# Patient Record
Sex: FEMALE | Race: WHITE | NOT HISPANIC OR LATINO | Employment: PART TIME | URBAN - METROPOLITAN AREA
[De-identification: names, ages, dates, MRNs, and addresses within clinical notes are randomized per-mention and may not be internally consistent; named-entity substitution may affect disease eponyms.]

---

## 2018-06-14 ENCOUNTER — APPOINTMENT (OUTPATIENT)
Dept: GENERAL RADIOLOGY | Facility: HOSPITAL | Age: 78
End: 2018-06-14

## 2018-06-14 ENCOUNTER — HOSPITAL ENCOUNTER (EMERGENCY)
Facility: HOSPITAL | Age: 78
Discharge: HOME | End: 2018-06-14
Attending: EMERGENCY MEDICINE | Admitting: NURSE PRACTITIONER

## 2018-06-14 VITALS
BODY MASS INDEX: 29.41 KG/M2 | HEART RATE: 70 BPM | WEIGHT: 166 LBS | OXYGEN SATURATION: 95 % | DIASTOLIC BLOOD PRESSURE: 68 MMHG | HEIGHT: 63 IN | RESPIRATION RATE: 16 BRPM | SYSTOLIC BLOOD PRESSURE: 154 MMHG | TEMPERATURE: 98.7 F

## 2018-06-14 DIAGNOSIS — I10 ESSENTIAL HYPERTENSION: Primary | ICD-10-CM

## 2018-06-14 LAB
ALBUMIN SERPL-MCNC: 4.74 G/DL (ref 3.2–4.8)
ALBUMIN/GLOB SERPL: 1.5 G/DL (ref 1.5–2.5)
ALP SERPL-CCNC: 80 U/L (ref 25–100)
ALT SERPL W P-5'-P-CCNC: 19 U/L (ref 7–40)
ANION GAP SERPL CALCULATED.3IONS-SCNC: 9 MMOL/L (ref 3–11)
AST SERPL-CCNC: 30 U/L (ref 0–33)
BASOPHILS # BLD AUTO: 0.02 10*3/MM3 (ref 0–0.2)
BASOPHILS NFR BLD AUTO: 0.3 % (ref 0–1)
BILIRUB SERPL-MCNC: 0.7 MG/DL (ref 0.3–1.2)
BNP SERPL-MCNC: 33 PG/ML (ref 0–100)
BUN BLD-MCNC: 11 MG/DL (ref 9–23)
BUN/CREAT SERPL: 15.1 (ref 7–25)
CALCIUM SPEC-SCNC: 9.8 MG/DL (ref 8.7–10.4)
CHLORIDE SERPL-SCNC: 107 MMOL/L (ref 99–109)
CO2 SERPL-SCNC: 26 MMOL/L (ref 20–31)
CREAT BLD-MCNC: 0.73 MG/DL (ref 0.6–1.3)
DEPRECATED RDW RBC AUTO: 45.2 FL (ref 37–54)
EOSINOPHIL # BLD AUTO: 0.19 10*3/MM3 (ref 0–0.3)
EOSINOPHIL NFR BLD AUTO: 3 % (ref 0–3)
ERYTHROCYTE [DISTWIDTH] IN BLOOD BY AUTOMATED COUNT: 13.2 % (ref 11.3–14.5)
GFR SERPL CREATININE-BSD FRML MDRD: 78 ML/MIN/1.73
GLOBULIN UR ELPH-MCNC: 3.1 GM/DL
GLUCOSE BLD-MCNC: 100 MG/DL (ref 70–100)
HCT VFR BLD AUTO: 44.2 % (ref 34.5–44)
HGB BLD-MCNC: 14.3 G/DL (ref 11.5–15.5)
IMM GRANULOCYTES # BLD: 0.01 10*3/MM3 (ref 0–0.03)
IMM GRANULOCYTES NFR BLD: 0.2 % (ref 0–0.6)
LYMPHOCYTES # BLD AUTO: 2.05 10*3/MM3 (ref 0.6–4.8)
LYMPHOCYTES NFR BLD AUTO: 32.1 % (ref 24–44)
MCH RBC QN AUTO: 30 PG (ref 27–31)
MCHC RBC AUTO-ENTMCNC: 32.4 G/DL (ref 32–36)
MCV RBC AUTO: 92.9 FL (ref 80–99)
MONOCYTES # BLD AUTO: 0.62 10*3/MM3 (ref 0–1)
MONOCYTES NFR BLD AUTO: 9.7 % (ref 0–12)
NEUTROPHILS # BLD AUTO: 3.5 10*3/MM3 (ref 1.5–8.3)
NEUTROPHILS NFR BLD AUTO: 54.7 % (ref 41–71)
PLATELET # BLD AUTO: 257 10*3/MM3 (ref 150–450)
PMV BLD AUTO: 10.3 FL (ref 6–12)
POTASSIUM BLD-SCNC: 4.7 MMOL/L (ref 3.5–5.5)
PROT SERPL-MCNC: 7.8 G/DL (ref 5.7–8.2)
RBC # BLD AUTO: 4.76 10*6/MM3 (ref 3.89–5.14)
SODIUM BLD-SCNC: 142 MMOL/L (ref 132–146)
TROPONIN I SERPL-MCNC: 0 NG/ML (ref 0–0.07)
WBC NRBC COR # BLD: 6.39 10*3/MM3 (ref 3.5–10.8)

## 2018-06-14 PROCEDURE — 85025 COMPLETE CBC W/AUTO DIFF WBC: CPT | Performed by: NURSE PRACTITIONER

## 2018-06-14 PROCEDURE — 83880 ASSAY OF NATRIURETIC PEPTIDE: CPT | Performed by: NURSE PRACTITIONER

## 2018-06-14 PROCEDURE — 71045 X-RAY EXAM CHEST 1 VIEW: CPT

## 2018-06-14 PROCEDURE — 84484 ASSAY OF TROPONIN QUANT: CPT

## 2018-06-14 PROCEDURE — 99284 EMERGENCY DEPT VISIT MOD MDM: CPT

## 2018-06-14 PROCEDURE — 93005 ELECTROCARDIOGRAM TRACING: CPT | Performed by: NURSE PRACTITIONER

## 2018-06-14 PROCEDURE — 80053 COMPREHEN METABOLIC PANEL: CPT | Performed by: NURSE PRACTITIONER

## 2018-06-14 RX ORDER — AMLODIPINE BESYLATE 5 MG/1
5 TABLET ORAL DAILY
COMMUNITY
End: 2018-12-11

## 2018-06-14 RX ORDER — PRAVASTATIN SODIUM 40 MG
40 TABLET ORAL NIGHTLY
COMMUNITY

## 2018-06-14 RX ORDER — NAPROXEN 500 MG/1
400 TABLET ORAL 2 TIMES DAILY PRN
COMMUNITY
End: 2019-07-02

## 2018-06-14 RX ORDER — OMEPRAZOLE 20 MG/1
20 CAPSULE, DELAYED RELEASE ORAL DAILY PRN
COMMUNITY
End: 2021-01-05 | Stop reason: HOSPADM

## 2018-06-14 RX ORDER — LOSARTAN POTASSIUM 50 MG/1
50 TABLET ORAL DAILY
COMMUNITY
End: 2018-12-11

## 2018-06-14 RX ORDER — ASPIRIN 81 MG/1
81 TABLET, CHEWABLE ORAL DAILY
COMMUNITY

## 2018-06-14 RX ORDER — AMLODIPINE BESYLATE 10 MG/1
10 TABLET ORAL
Status: DISCONTINUED | OUTPATIENT
Start: 2018-06-14 | End: 2018-06-15 | Stop reason: HOSPADM

## 2018-06-14 RX ORDER — SODIUM CHLORIDE 0.9 % (FLUSH) 0.9 %
10 SYRINGE (ML) INJECTION AS NEEDED
Status: DISCONTINUED | OUTPATIENT
Start: 2018-06-14 | End: 2018-06-14

## 2018-06-14 RX ADMIN — AMLODIPINE BESYLATE 10 MG: 10 TABLET ORAL at 20:39

## 2018-06-14 NOTE — ED PROVIDER NOTES
Subjective   Ms. Mine Curry is a 77 year old female who presents to the ED with c/o hypertension. She reports she had been experiencing neck pain which she read can be a side-effect of Losartan. She stopped taking Losartan (50 mg) 2 days ago and was checked at her PCP today and her blood pressure was high. She was given 2 doses of Losartan and started on Amlodipine 5 mg. Her blood pressure at home was 191 systolic, she took an Amlodipine at 1320 and her BP continued to rise which prompted her to present to the ED. She denies any symptoms. There are no other acute symptoms at this time.          History provided by:  Relative and patient  Hypertension   Severity:  Moderate  Onset quality:  Sudden  Duration:  2 days  Timing:  Constant  Progression:  Worsening  Chronicity:  Chronic  Time since last dose of antihypertensive:  1 hour  Notable PTA blood pressures:  191  Context: noncompliance (for 2 days)    Relieved by:  Nothing  Worsened by:  Nothing  Ineffective treatments:  Ca channel blockers  Associated symptoms: no shortness of breath        Review of Systems   Respiratory: Negative for shortness of breath.    Cardiovascular: Negative for leg swelling.   All other systems reviewed and are negative.      Past Medical History:   Diagnosis Date   • Hypertension    • Injury of back    • Spinal stenosis        No Known Allergies    Past Surgical History:   Procedure Laterality Date   • ANKLE SURGERY     • TUBAL ABDOMINAL LIGATION         History reviewed. No pertinent family history.    Social History     Social History   • Marital status:      Social History Main Topics   • Smoking status: Never Smoker   • Alcohol use Yes      Comment: DRINKS A BEER ONCE PER WEEK   • Drug use: No     Other Topics Concern   • Not on file         Objective   Physical Exam   Constitutional: She is oriented to person, place, and time. She appears well-developed and well-nourished. No distress.   HENT:   Head: Normocephalic and  atraumatic.   Nose: Nose normal.   Eyes: Conjunctivae are normal. No scleral icterus.   Neck: Normal range of motion. Neck supple.   Cardiovascular: Normal rate, regular rhythm and normal heart sounds.    Pulmonary/Chest: Effort normal and breath sounds normal. No respiratory distress.   Abdominal: Soft. Bowel sounds are normal. There is no tenderness.   Musculoskeletal: Normal range of motion. She exhibits no edema.   Neurological: She is alert and oriented to person, place, and time.   Skin: Skin is warm and dry.   Psychiatric: She has a normal mood and affect. Her behavior is normal.   Nursing note and vitals reviewed.      Procedures         ED Course  ED Course as of Jun 14 2212   Thu Jun 14, 2018 2205 Patient continues to assert that she is feeling well and continues to be asymptomatic.  She has had a total of four blood pressure meds today since morning.  She will resume her new regimen tomorrow with new BP med with keeping a written log of BP reading and confer with her PCP.  She understands parameters for concern that warrant return to the ER.   [ML]      ED Course User Index  [ML] RENNY Andrade       Recent Results (from the past 24 hour(s))   BNP    Collection Time: 06/14/18  7:42 PM   Result Value Ref Range    BNP 33.0 0.0 - 100.0 pg/mL   Comprehensive Metabolic Panel    Collection Time: 06/14/18  7:42 PM   Result Value Ref Range    Glucose 100 70 - 100 mg/dL    BUN 11 9 - 23 mg/dL    Creatinine 0.73 0.60 - 1.30 mg/dL    Sodium 142 132 - 146 mmol/L    Potassium 4.7 3.5 - 5.5 mmol/L    Chloride 107 99 - 109 mmol/L    CO2 26.0 20.0 - 31.0 mmol/L    Calcium 9.8 8.7 - 10.4 mg/dL    Total Protein 7.8 5.7 - 8.2 g/dL    Albumin 4.74 3.20 - 4.80 g/dL    ALT (SGPT) 19 7 - 40 U/L    AST (SGOT) 30 0 - 33 U/L    Alkaline Phosphatase 80 25 - 100 U/L    Total Bilirubin 0.7 0.3 - 1.2 mg/dL    eGFR Non African Amer 78 >60 mL/min/1.73    Globulin 3.1 gm/dL    A/G Ratio 1.5 1.5 - 2.5 g/dL    BUN/Creatinine  Ratio 15.1 7.0 - 25.0    Anion Gap 9.0 3.0 - 11.0 mmol/L   CBC Auto Differential    Collection Time: 06/14/18  7:42 PM   Result Value Ref Range    WBC 6.39 3.50 - 10.80 10*3/mm3    RBC 4.76 3.89 - 5.14 10*6/mm3    Hemoglobin 14.3 11.5 - 15.5 g/dL    Hematocrit 44.2 (H) 34.5 - 44.0 %    MCV 92.9 80.0 - 99.0 fL    MCH 30.0 27.0 - 31.0 pg    MCHC 32.4 32.0 - 36.0 g/dL    RDW 13.2 11.3 - 14.5 %    RDW-SD 45.2 37.0 - 54.0 fl    MPV 10.3 6.0 - 12.0 fL    Platelets 257 150 - 450 10*3/mm3    Neutrophil % 54.7 41.0 - 71.0 %    Lymphocyte % 32.1 24.0 - 44.0 %    Monocyte % 9.7 0.0 - 12.0 %    Eosinophil % 3.0 0.0 - 3.0 %    Basophil % 0.3 0.0 - 1.0 %    Immature Grans % 0.2 0.0 - 0.6 %    Neutrophils, Absolute 3.50 1.50 - 8.30 10*3/mm3    Lymphocytes, Absolute 2.05 0.60 - 4.80 10*3/mm3    Monocytes, Absolute 0.62 0.00 - 1.00 10*3/mm3    Eosinophils, Absolute 0.19 0.00 - 0.30 10*3/mm3    Basophils, Absolute 0.02 0.00 - 0.20 10*3/mm3    Immature Grans, Absolute 0.01 0.00 - 0.03 10*3/mm3   POC Troponin, Rapid    Collection Time: 06/14/18  7:46 PM   Result Value Ref Range    Troponin I 0.00 0.00 - 0.07 ng/mL     Note: In addition to lab results from this visit, the labs listed above may include labs taken at another facility or during a different encounter within the last 24 hours. Please correlate lab times with ED admission and discharge times for further clarification of the services performed during this visit.    XR Chest 1 View   Final Result      1. No acute findings.      2. Non-acute findings are described above.      THIS DOCUMENT HAS BEEN ELECTRONICALLY SIGNED BY NAM HODGES MD        Vitals:    06/14/18 2039 06/14/18 2109 06/14/18 2110 06/14/18 2115   BP: (!) 172/119 (!) 189/91 (!) 189/91 169/92   BP Location:   Left arm    Patient Position:   Sitting    Pulse: 71  72 69   Resp:   16    Temp:       TempSrc:       SpO2:  97% 94% 98%   Weight:       Height:         Medications   amLODIPine (NORVASC) tablet 10 mg (10 mg  Oral Given 6/14/18 2039)     ECG/EMG Results (last 24 hours)     Procedure Component Value Units Date/Time    ECG 12 Lead [[id number]] Collected:  06/14/18 1933     Updated:  06/14/18 1936                      MDM    Final diagnoses:   Essential hypertension       Documentation assistance provided by florina Carr.  Information recorded by the scribe was done at my direction and has been verified and validated by me.     Prateek Carr  06/14/18 2000       RENNY Andrade  06/14/18 9816

## 2018-06-15 NOTE — DISCHARGE INSTRUCTIONS
Resume your plan for taking amlodipine as directed by your primary care doctor in the morning.  Keep a written log of your blood pressure readings once a day.  Return to the emergency department as needed for worsening symptoms or concerns.  Thank you

## 2018-08-09 ENCOUNTER — HOSPITAL ENCOUNTER (EMERGENCY)
Facility: HOSPITAL | Age: 78
Discharge: HOME | End: 2018-08-10
Attending: EMERGENCY MEDICINE | Admitting: EMERGENCY MEDICINE

## 2018-08-09 ENCOUNTER — APPOINTMENT (OUTPATIENT)
Dept: GENERAL RADIOLOGY | Facility: HOSPITAL | Age: 78
End: 2018-08-09

## 2018-08-09 DIAGNOSIS — I10 UNCONTROLLED HYPERTENSION: ICD-10-CM

## 2018-08-09 DIAGNOSIS — R42 DIZZINESS: Primary | ICD-10-CM

## 2018-08-09 LAB
ALBUMIN SERPL-MCNC: 4.45 G/DL (ref 3.2–4.8)
ALBUMIN/GLOB SERPL: 1.6 G/DL (ref 1.5–2.5)
ALP SERPL-CCNC: 69 U/L (ref 25–100)
ALT SERPL W P-5'-P-CCNC: 19 U/L (ref 7–40)
ANION GAP SERPL CALCULATED.3IONS-SCNC: 17 MMOL/L (ref 3–11)
AST SERPL-CCNC: 23 U/L (ref 0–33)
BASOPHILS # BLD AUTO: 0.04 10*3/MM3 (ref 0–0.2)
BASOPHILS NFR BLD AUTO: 0.6 % (ref 0–1)
BILIRUB SERPL-MCNC: 0.4 MG/DL (ref 0.3–1.2)
BUN BLD-MCNC: 15 MG/DL (ref 9–23)
BUN/CREAT SERPL: 23.4 (ref 7–25)
CALCIUM SPEC-SCNC: 9.5 MG/DL (ref 8.7–10.4)
CHLORIDE SERPL-SCNC: 105 MMOL/L (ref 99–109)
CO2 SERPL-SCNC: 24 MMOL/L (ref 20–31)
CREAT BLD-MCNC: 0.64 MG/DL (ref 0.6–1.3)
DEPRECATED RDW RBC AUTO: 49.1 FL (ref 37–54)
EOSINOPHIL # BLD AUTO: 0.18 10*3/MM3 (ref 0–0.3)
EOSINOPHIL NFR BLD AUTO: 2.9 % (ref 0–3)
ERYTHROCYTE [DISTWIDTH] IN BLOOD BY AUTOMATED COUNT: 14.5 % (ref 11.3–14.5)
GFR SERPL CREATININE-BSD FRML MDRD: 90 ML/MIN/1.73
GLOBULIN UR ELPH-MCNC: 2.9 GM/DL
GLUCOSE BLD-MCNC: 108 MG/DL (ref 70–100)
HCT VFR BLD AUTO: 42.7 % (ref 34.5–44)
HGB BLD-MCNC: 14 G/DL (ref 11.5–15.5)
IMM GRANULOCYTES # BLD: 0.01 10*3/MM3 (ref 0–0.03)
IMM GRANULOCYTES NFR BLD: 0.2 % (ref 0–0.6)
LYMPHOCYTES # BLD AUTO: 2.09 10*3/MM3 (ref 0.6–4.8)
LYMPHOCYTES NFR BLD AUTO: 33.3 % (ref 24–44)
MCH RBC QN AUTO: 30.2 PG (ref 27–31)
MCHC RBC AUTO-ENTMCNC: 32.8 G/DL (ref 32–36)
MCV RBC AUTO: 92.2 FL (ref 80–99)
MONOCYTES # BLD AUTO: 0.6 10*3/MM3 (ref 0–1)
MONOCYTES NFR BLD AUTO: 9.6 % (ref 0–12)
NEUTROPHILS # BLD AUTO: 3.37 10*3/MM3 (ref 1.5–8.3)
NEUTROPHILS NFR BLD AUTO: 53.6 % (ref 41–71)
PLATELET # BLD AUTO: 258 10*3/MM3 (ref 150–450)
PMV BLD AUTO: 10.7 FL (ref 6–12)
POTASSIUM BLD-SCNC: 3.7 MMOL/L (ref 3.5–5.5)
PROT SERPL-MCNC: 7.3 G/DL (ref 5.7–8.2)
RBC # BLD AUTO: 4.63 10*6/MM3 (ref 3.89–5.14)
SODIUM BLD-SCNC: 146 MMOL/L (ref 132–146)
WBC NRBC COR # BLD: 6.28 10*3/MM3 (ref 3.5–10.8)

## 2018-08-09 PROCEDURE — 93005 ELECTROCARDIOGRAM TRACING: CPT | Performed by: NURSE PRACTITIONER

## 2018-08-09 PROCEDURE — 81001 URINALYSIS AUTO W/SCOPE: CPT | Performed by: NURSE PRACTITIONER

## 2018-08-09 PROCEDURE — 80053 COMPREHEN METABOLIC PANEL: CPT | Performed by: NURSE PRACTITIONER

## 2018-08-09 PROCEDURE — 85025 COMPLETE CBC W/AUTO DIFF WBC: CPT | Performed by: NURSE PRACTITIONER

## 2018-08-09 PROCEDURE — 71045 X-RAY EXAM CHEST 1 VIEW: CPT

## 2018-08-09 PROCEDURE — 84484 ASSAY OF TROPONIN QUANT: CPT

## 2018-08-09 PROCEDURE — 99283 EMERGENCY DEPT VISIT LOW MDM: CPT

## 2018-08-09 RX ORDER — SODIUM CHLORIDE 0.9 % (FLUSH) 0.9 %
10 SYRINGE (ML) INJECTION AS NEEDED
Status: DISCONTINUED | OUTPATIENT
Start: 2018-08-09 | End: 2018-08-10 | Stop reason: HOSPADM

## 2018-08-09 RX ORDER — NIFEDIPINE 90 MG/1
90 TABLET, EXTENDED RELEASE ORAL DAILY
COMMUNITY
End: 2019-07-02

## 2018-08-09 RX ORDER — HYDROCHLOROTHIAZIDE 25 MG/1
25 TABLET ORAL DAILY
COMMUNITY
End: 2018-12-11

## 2018-08-10 ENCOUNTER — APPOINTMENT (OUTPATIENT)
Dept: MRI IMAGING | Facility: HOSPITAL | Age: 78
End: 2018-08-10

## 2018-08-10 VITALS
SYSTOLIC BLOOD PRESSURE: 131 MMHG | DIASTOLIC BLOOD PRESSURE: 46 MMHG | HEIGHT: 63 IN | WEIGHT: 166 LBS | OXYGEN SATURATION: 96 % | BODY MASS INDEX: 29.41 KG/M2 | TEMPERATURE: 97.4 F | HEART RATE: 63 BPM | RESPIRATION RATE: 18 BRPM

## 2018-08-10 LAB
BACTERIA UR QL AUTO: NORMAL /HPF
BILIRUB UR QL STRIP: NEGATIVE
CLARITY UR: CLEAR
COLOR UR: YELLOW
GLUCOSE UR STRIP-MCNC: NEGATIVE MG/DL
HGB UR QL STRIP.AUTO: NEGATIVE
HYALINE CASTS UR QL AUTO: NORMAL /LPF
KETONES UR QL STRIP: NEGATIVE
LEUKOCYTE ESTERASE UR QL STRIP.AUTO: ABNORMAL
NITRITE UR QL STRIP: NEGATIVE
PH UR STRIP.AUTO: 6.5 [PH] (ref 5–8)
PROT UR QL STRIP: NEGATIVE
RBC # UR: NORMAL /HPF
REF LAB TEST METHOD: NORMAL
SP GR UR STRIP: 1.01 (ref 1–1.03)
SQUAMOUS #/AREA URNS HPF: NORMAL /HPF
TROPONIN I SERPL-MCNC: 0 NG/ML (ref 0–0.07)
UROBILINOGEN UR QL STRIP: ABNORMAL
WBC UR QL AUTO: NORMAL /HPF

## 2018-08-10 PROCEDURE — 70551 MRI BRAIN STEM W/O DYE: CPT

## 2018-08-10 RX ORDER — MECLIZINE HYDROCHLORIDE 25 MG/1
25 TABLET ORAL 3 TIMES DAILY PRN
Qty: 24 TABLET | Refills: 0 | Status: SHIPPED | OUTPATIENT
Start: 2018-08-10 | End: 2018-12-11

## 2018-08-10 RX ORDER — MECLIZINE HYDROCHLORIDE 25 MG/1
25 TABLET ORAL ONCE
Status: COMPLETED | OUTPATIENT
Start: 2018-08-10 | End: 2018-08-10

## 2018-08-10 RX ADMIN — MECLIZINE 25 MG: 25 TABLET ORAL at 01:42

## 2018-08-10 NOTE — ED PROVIDER NOTES
Subjective   Mine Curry is a 77 y.o. female with a hx of HTN who presents to the ED with c/o hypertension. The patient reports of an episode of dizziness last night when she got up from bed to go to the bathroom. Since this incident she notes that she would become dizzy whenever she stands up. She checked her blood pressure PTA and notes that it was 178 systolic which prompted her visit to the ED. The patient denies N/V, chest pain, SoA, generalized weakness, or any other acute complaints at this time.        History provided by:  Patient  Hypertension   Onset quality:  Sudden  Timing:  Constant  Progression:  Worsening  Chronicity:  Chronic  Notable PTA blood pressures:  178  Relieved by:  None tried  Worsened by:  Nothing  Ineffective treatments:  None tried  Associated symptoms: dizziness    Associated symptoms: no chest pain, no fever, no nausea, no shortness of breath, not vomiting and no weakness        Review of Systems   Constitutional: Negative for chills and fever.   Respiratory: Negative for shortness of breath.    Cardiovascular: Negative for chest pain.   Gastrointestinal: Negative for nausea and vomiting.   Neurological: Positive for dizziness. Negative for weakness. Tremors: generalized.   All other systems reviewed and are negative.      Past Medical History:   Diagnosis Date   • Hypertension    • Injury of back    • Spinal stenosis        No Known Allergies    Past Surgical History:   Procedure Laterality Date   • ANKLE SURGERY     • TUBAL ABDOMINAL LIGATION         History reviewed. No pertinent family history.    Social History     Social History   • Marital status:      Social History Main Topics   • Smoking status: Never Smoker   • Alcohol use Yes      Comment: DRINKS A BEER ONCE PER WEEK   • Drug use: No     Other Topics Concern   • Not on file         Objective   Physical Exam   Constitutional: She is oriented to person, place, and time. She appears well-developed and  well-nourished. No distress.   HENT:   Head: Normocephalic and atraumatic.   Nose: Nose normal.   Eyes: Conjunctivae are normal.   Neck: Normal range of motion. Neck supple.   Cardiovascular: Normal rate, regular rhythm, normal heart sounds and intact distal pulses.    No murmur heard.  Pulmonary/Chest: Effort normal and breath sounds normal. No respiratory distress.   Abdominal: Soft. Bowel sounds are normal. There is no tenderness.   Musculoskeletal: Normal range of motion. She exhibits no edema.   Neurological: She is alert and oriented to person, place, and time. No cranial nerve deficit. Coordination normal.   Skin: Skin is warm and dry.   Psychiatric: She has a normal mood and affect. Her behavior is normal.   Nursing note and vitals reviewed.      Procedures         ED Course  ED Course as of Aug 10 0448   Fri Aug 10, 2018   [id number]  patient is advised of results at this time.  Patient will be discharged home.  Patient tolerated ambulation trial without difficulty.  Patient agrees and verbalizes understanding.  [KG]      ED Course User Index  [KG] Rylee Amor, RENNY       Recent Results (from the past 24 hour(s))   Comprehensive Metabolic Panel    Collection Time: 08/09/18 11:09 PM   Result Value Ref Range    Glucose 108 (H) 70 - 100 mg/dL    BUN 15 9 - 23 mg/dL    Creatinine 0.64 0.60 - 1.30 mg/dL    Sodium 146 132 - 146 mmol/L    Potassium 3.7 3.5 - 5.5 mmol/L    Chloride 105 99 - 109 mmol/L    CO2 24.0 20.0 - 31.0 mmol/L    Calcium 9.5 8.7 - 10.4 mg/dL    Total Protein 7.3 5.7 - 8.2 g/dL    Albumin 4.45 3.20 - 4.80 g/dL    ALT (SGPT) 19 7 - 40 U/L    AST (SGOT) 23 0 - 33 U/L    Alkaline Phosphatase 69 25 - 100 U/L    Total Bilirubin 0.4 0.3 - 1.2 mg/dL    eGFR Non African Amer 90 >60 mL/min/1.73    Globulin 2.9 gm/dL    A/G Ratio 1.6 1.5 - 2.5 g/dL    BUN/Creatinine Ratio 23.4 7.0 - 25.0    Anion Gap 17.0 (H) 3.0 - 11.0 mmol/L   CBC Auto Differential    Collection Time: 08/09/18 11:09 PM   Result  Value Ref Range    WBC 6.28 3.50 - 10.80 10*3/mm3    RBC 4.63 3.89 - 5.14 10*6/mm3    Hemoglobin 14.0 11.5 - 15.5 g/dL    Hematocrit 42.7 34.5 - 44.0 %    MCV 92.2 80.0 - 99.0 fL    MCH 30.2 27.0 - 31.0 pg    MCHC 32.8 32.0 - 36.0 g/dL    RDW 14.5 11.3 - 14.5 %    RDW-SD 49.1 37.0 - 54.0 fl    MPV 10.7 6.0 - 12.0 fL    Platelets 258 150 - 450 10*3/mm3    Neutrophil % 53.6 41.0 - 71.0 %    Lymphocyte % 33.3 24.0 - 44.0 %    Monocyte % 9.6 0.0 - 12.0 %    Eosinophil % 2.9 0.0 - 3.0 %    Basophil % 0.6 0.0 - 1.0 %    Immature Grans % 0.2 0.0 - 0.6 %    Neutrophils, Absolute 3.37 1.50 - 8.30 10*3/mm3    Lymphocytes, Absolute 2.09 0.60 - 4.80 10*3/mm3    Monocytes, Absolute 0.60 0.00 - 1.00 10*3/mm3    Eosinophils, Absolute 0.18 0.00 - 0.30 10*3/mm3    Basophils, Absolute 0.04 0.00 - 0.20 10*3/mm3    Immature Grans, Absolute 0.01 0.00 - 0.03 10*3/mm3   POC Troponin, Rapid    Collection Time: 08/09/18 11:40 PM   Result Value Ref Range    Troponin I 0.00 0.00 - 0.07 ng/mL   Urinalysis With Microscopic If Indicated (No Culture) - Urine, Clean Catch    Collection Time: 08/09/18 11:45 PM   Result Value Ref Range    Color, UA Yellow Yellow, Straw    Appearance, UA Clear Clear    pH, UA 6.5 5.0 - 8.0    Specific Gravity, UA 1.007 1.001 - 1.030    Glucose, UA Negative Negative    Ketones, UA Negative Negative    Bilirubin, UA Negative Negative    Blood, UA Negative Negative    Protein, UA Negative Negative    Leuk Esterase, UA Small (1+) (A) Negative    Nitrite, UA Negative Negative    Urobilinogen, UA 0.2 E.U./dL 0.2 - 1.0 E.U./dL   Urinalysis, Microscopic Only - Urine, Clean Catch    Collection Time: 08/09/18 11:45 PM   Result Value Ref Range    RBC, UA 0-2 None Seen, 0-2 /HPF    WBC, UA 0-2 None Seen, 0-2 /HPF    Bacteria, UA None Seen None Seen, Trace /HPF    Squamous Epithelial Cells, UA 0-2 None Seen, 0-2 /HPF    Hyaline Casts, UA 0-6 0 - 6 /LPF    Methodology Automated Microscopy      Note: In addition to lab results from  "this visit, the labs listed above may include labs taken at another facility or during a different encounter within the last 24 hours. Please correlate lab times with ED admission and discharge times for further clarification of the services performed during this visit.    MRI Brain Without Contrast   Final Result     No acute infarct, acute intracranial hemorrhage, or mass effect.      THIS DOCUMENT HAS BEEN ELECTRONICALLY SIGNED BY MARIA C DORAN MD      XR Chest 1 View   Final Result     Clear lungs.      THIS DOCUMENT HAS BEEN ELECTRONICALLY SIGNED BY MARIA C DORAN MD        Vitals:    08/09/18 2029 08/09/18 2253 08/10/18 0142   BP: 174/83 143/75 131/46   Pulse: 75 63 63   Resp: 18  18   Temp: 97.4 °F (36.3 °C)     TempSrc: Oral     SpO2: 98% 97% 96%   Weight: 75.3 kg (166 lb)     Height: 160 cm (63\")       Medications   meclizine (ANTIVERT) tablet 25 mg (25 mg Oral Given 8/10/18 0142)     ECG/EMG Results (last 24 hours)     Procedure Component Value Units Date/Time    ECG 12 Lead [[id number]] Collected:  08/09/18 2315     Updated:  08/09/18 2316                      MDM    Final diagnoses:   Dizziness   Uncontrolled hypertension       Documentation assistance provided by florina Santos.  Information recorded by the scribe was done at my direction and has been verified and validated by me.     Mynor Santos  08/09/18 9059       Rylee Amor APRN  08/10/18 0362    "

## 2018-12-11 ENCOUNTER — HOSPITAL ENCOUNTER (EMERGENCY)
Facility: HOSPITAL | Age: 78
Discharge: HOME | End: 2018-12-11
Attending: EMERGENCY MEDICINE | Admitting: EMERGENCY MEDICINE

## 2018-12-11 ENCOUNTER — APPOINTMENT (OUTPATIENT)
Dept: GENERAL RADIOLOGY | Facility: HOSPITAL | Age: 78
End: 2018-12-11

## 2018-12-11 VITALS
WEIGHT: 170 LBS | BODY MASS INDEX: 30.12 KG/M2 | RESPIRATION RATE: 18 BRPM | HEIGHT: 63 IN | OXYGEN SATURATION: 99 % | TEMPERATURE: 97.6 F | DIASTOLIC BLOOD PRESSURE: 76 MMHG | HEART RATE: 87 BPM | SYSTOLIC BLOOD PRESSURE: 147 MMHG

## 2018-12-11 DIAGNOSIS — S80.01XA CONTUSION OF RIGHT KNEE, INITIAL ENCOUNTER: ICD-10-CM
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
## 2018-12-11 DIAGNOSIS — Z86.79 HISTORY OF HYPERTENSION: ICD-10-CM

## 2018-12-11 DIAGNOSIS — S70.01XA CONTUSION OF RIGHT HIP, INITIAL ENCOUNTER: ICD-10-CM

## 2018-12-11 DIAGNOSIS — S20.211A CONTUSION OF RIBS, RIGHT, INITIAL ENCOUNTER: ICD-10-CM
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
## 2018-12-11 PROCEDURE — 99283 EMERGENCY DEPT VISIT LOW MDM: CPT

## 2018-12-11 PROCEDURE — 71101 X-RAY EXAM UNILAT RIBS/CHEST: CPT

## 2018-12-11 PROCEDURE — 73502 X-RAY EXAM HIP UNI 2-3 VIEWS: CPT

## 2018-12-11 PROCEDURE — 73560 X-RAY EXAM OF KNEE 1 OR 2: CPT

## 2018-12-11 NOTE — ED PROVIDER NOTES
Subjective   Mine Curry is a 78 y.o.female with a history of hypertension and chronic back pain from spinal stenosis who presents to the emergency department with complaints of a fall. The patient states that she was at work this evening when she tripped over a box and fell on her right side into a counter. She was able to catch herself with her right arm before landing with her right knee on the floor. She's been ambulatory since the event but currently endorses pain over the right lateral knee, the right lateral chest/axilla and breast, the extensor surface of the right upper arm, the right hip, and the left side of the neck. She denies hitting her head or experiencing loss of consciousness. There are no other acute complaints at this time.        History provided by:  Patient  Fall   Mechanism of injury: fall    Injury location:  Torso, shoulder/arm and head/neck  Head/neck injury location:  L neck  Shoulder/arm injury location:  R upper arm  Torso injury location:  R breast and R chest  Incident location:  Work  Arrived directly from scene: yes    Fall:     Fall occurred:  Tripped and walking    Impact surface:  Furniture and hard floor (counter)    Point of impact:  Knees and outstretched arms    Entrapped after fall: no    Suspicion of alcohol use: no    Suspicion of drug use: no    Prior to arrival data:     Patient ambulatory at scene: yes      Loss of consciousness: no      Amnesic to event: no    Associated symptoms: neck pain    Associated symptoms: no abdominal pain, no back pain, no chest pain, no headaches, no loss of consciousness, no nausea and no vomiting    Associated symptoms comment:  Pain in right axilla      Review of Systems   Respiratory: Negative for shortness of breath.    Cardiovascular: Negative for chest pain.   Gastrointestinal: Negative for abdominal pain, nausea and vomiting.   Musculoskeletal: Positive for arthralgias (right knee) and neck pain. Negative for back pain and gait  problem.        Pain in right axilla, right lateral chest wall, right hip, and right knee   Skin: Negative.    Neurological: Negative for dizziness, loss of consciousness, syncope, light-headedness and headaches.   All other systems reviewed and are negative.      Past Medical History:   Diagnosis Date   • Hypertension    • Injury of back    • Spinal stenosis        No Known Allergies    Past Surgical History:   Procedure Laterality Date   • ANKLE SURGERY     • TUBAL ABDOMINAL LIGATION         History reviewed. No pertinent family history.    Social History     Socioeconomic History   • Marital status:      Spouse name: Not on file   • Number of children: Not on file   • Years of education: Not on file   • Highest education level: Not on file   Tobacco Use   • Smoking status: Never Smoker   Substance and Sexual Activity   • Alcohol use: Yes     Comment: DRINKS A BEER ONCE PER WEEK   • Drug use: No         Objective   Physical Exam   Constitutional: She is oriented to person, place, and time. She appears well-developed and well-nourished. No distress.   HENT:   Head: Normocephalic and atraumatic.   Eyes: Conjunctivae are normal. No scleral icterus.   Neck: Normal range of motion. Neck supple.   Spasms of the left cervical myofascia. Full range of motion of the neck.   Cardiovascular: Normal rate, regular rhythm and normal heart sounds.   No murmur heard.  Pulmonary/Chest: Effort normal and breath sounds normal. No respiratory distress. She exhibits tenderness.   Tenderness to palpation of the right lateral ribcage near the axilla. No bruising, deformity, or palpable crepitus. No right breast tenderness.   Abdominal: Soft. There is no tenderness.   Musculoskeletal: Normal range of motion. She exhibits tenderness. She exhibits no edema or deformity.        Right hip: She exhibits tenderness. She exhibits no deformity.        Right knee: She exhibits normal range of motion, no swelling and no effusion.  "Tenderness found.   No CTL spine tendernes. Tenderness to palpation of the right hip. No deformity or dislocation. Tenderness to palpation of the right lateral knee. No soft tissue swelling or effusion. Full range of motion.   Neurological: She is alert and oriented to person, place, and time.   Skin: Skin is warm and dry. No erythema.   Psychiatric: She has a normal mood and affect. Her behavior is normal.   Nursing note and vitals reviewed.      Procedures         ED Course  ED Course as of Dec 11 1849   Tue Dec 11, 2018   1845 All radiologic studies were read by the radiologist and were all within normal limits.  There was no acute right rib fracture and there was no evidence of pneumothorax.  X-rays of the right hip and pelvis showed no acute bony abnormality and x-rays of the right knee showed no acute bony abnormality.  I discussed all normal results with patient.  She verbalized understanding.  Recommended Tylenol/ibuprofen every 4-6 hours as needed for pain.  I will fill out Worker's Compensation paperwork.  Recommend close PCP follow-up for recheck.  [FC]      ED Course User Index  [FC] Lilian Hodge, OLGA     No results found for this or any previous visit (from the past 24 hour(s)).  Note: In addition to lab results from this visit, the labs listed above may include labs taken at another facility or during a different encounter within the last 24 hours. Please correlate lab times with ED admission and discharge times for further clarification of the services performed during this visit.    XR Ribs Right With PA Chest    (Results Pending)   XR Hip With or Without Pelvis 2 - 3 View Right    (Results Pending)   XR Knee 1 or 2 View Right    (Results Pending)     Vitals:    12/11/18 1627   BP: (!) 198/91   BP Location: Left arm   Patient Position: Sitting   Pulse: 89   Resp: 20   Temp: 97.6 °F (36.4 °C)   TempSrc: Oral   SpO2: 98%   Weight: 77.1 kg (170 lb)   Height: 160 cm (63\")     Medications - No data to " display  ECG/EMG Results (last 24 hours)     ** No results found for the last 24 hours. **                       MDM    Final diagnoses:   Fall, initial encounter   Work related injury   Contusion of ribs, right, initial encounter   Contusion of right hip, initial encounter   Contusion of right knee, initial encounter   History of hypertension       Documentation assistance provided by florina Cardenas.  Information recorded by the scribe was done at my direction and has been verified and validated by me.     Anju Cardenas  12/11/18 180       Lilian Hodge PA-C  12/11/18 2464

## 2018-12-11 NOTE — DISCHARGE INSTRUCTIONS
All radiologic studies during the ER evaluation showed no acute bony abnormality from fall at patient's place of work.  Recommend Tylenol/ibuprofen every 4-6 hours as needed for pain.  Encourage deep breathing even though pain to the right lateral chest wall so as to prevent pneumonia.  Follow up closely with primary care physician.  Return if any worsening symptoms.

## 2019-07-01 NOTE — PROGRESS NOTES
[facility] at [facility]  Consultation History and Physical  Mine Curry  1940    VISIT DATE:  07/02/19    PCP:   Ten Alicea, DO  [address] [place] [address]      CC:  PVCs    Problem List:  1.  HTN  2.  HLD  3.  GERD  4.  DJD  5.  PVCs      History of Present Illness:  Mine Curry  Is a 78 y.o. female with pertinent cardiac history detailed above.  Patient referred by Dr. Contreras for evaluation of PVCs.  She been complaining of palpitations.  EKG in primary care office showed ventricular bigeminy.  She wore a Holter monitor showing 11.8% PVCs.  She was advised to cut down on caffeine intake by her PCP and she states this has helped the frequency of the palpitations.  She denies any exercise intolerance, dyspnea, chest pain.  No syncope or presyncope.  Not currently on any specific therapies for PVCs.  Has not had a recent echocardiogram.  Blood pressures controlled in clinic today.  No other complaints      There are no active problems to display for this patient.      Allergies   Allergen Reactions   • Hydrocodone Itching   • Iodine Swelling       Social History     Socioeconomic History   • Marital status:      Spouse name: Not on file   • Number of children: Not on file   • Years of education: Not on file   • Highest education level: Not on file   Tobacco Use   • Smoking status: Never Smoker   • Smokeless tobacco: Never Used   Substance and Sexual Activity   • Alcohol use: Yes     Comment: DRINKS A BEER ONCE PER WEEK   • Drug use: No   • Sexual activity: Defer       Family History   Problem Relation Age of Onset   • Arthritis Mother    • Aneurysm Father    • Hypertension Father    • Stroke Father    • Arthritis Father        Current Medications:    Current Outpatient Medications:   •  amLODIPine (NORVASC) 10 MG tablet, Take 10 mg by mouth Daily., Disp: , Rfl:   •  aspirin 81 MG chewable tablet, Chew 81 mg Daily., Disp: , Rfl:   •   "hydrochlorothiazide (MICROZIDE) 12.5 MG capsule, Take 12.5 mg by mouth Daily., Disp: , Rfl:   •  IBUPROFEN PO, Take 400 mg by mouth Daily., Disp: , Rfl:   •  OMEPRAZOLE PO, Take 20 mg by mouth As Needed., Disp: , Rfl:   •  Pravastatin Sodium (PRAVACHOL PO), Take 40 mg by mouth., Disp: , Rfl:   •  VITAMIN E PO, Take 1 tablet by mouth Daily., Disp: , Rfl:   •  metoprolol succinate XL (TOPROL-XL) 25 MG 24 hr tablet, Take 1 tablet by mouth Daily., Disp: 30 tablet, Rfl: 11     Review of Systems   Constitution: Negative for weakness and malaise/fatigue.   Cardiovascular: Positive for irregular heartbeat and palpitations. Negative for chest pain, dyspnea on exertion, orthopnea and syncope.   Respiratory: Negative for cough and shortness of breath.    All other systems reviewed and are negative.      Vitals:    07/02/19 0849   BP: 128/78   BP Location: Right arm   Patient Position: Sitting   Pulse: 71   SpO2: 98%   Weight: 74.4 kg (164 lb)   Height: 160 cm (63\")       Physical Exam   Constitutional: She is oriented to person, place, and time. She appears well-developed and well-nourished.   HENT:   Head: Normocephalic and atraumatic.   Neck: Neck supple. No JVD present.   Cardiovascular: Normal rate, regular rhythm, normal heart sounds and intact distal pulses. Exam reveals no gallop and no friction rub.   No murmur heard.  Pulmonary/Chest: Effort normal and breath sounds normal.   Abdominal: Soft. Bowel sounds are normal.   Musculoskeletal: Normal range of motion. She exhibits no edema.   Neurological: She is alert and oriented to person, place, and time.   Skin: Skin is warm and dry.   Nursing note and vitals reviewed.      Diagnostic Data:    ECG 12 Lead  Date/Time: 7/2/2019 9:02 AM  Performed by: Bear Solorzano MD  Authorized by: Bear Solorzano MD   Comparison: compared with previous ECG from 8/9/2018  Similar to previous ECG  Rhythm: sinus rhythm  Rate: normal  BPM: 66  QRS axis: normal  Other " findings: low voltage    Clinical impression: non-specific ECG  Clinical impression comment: Borderline left atrial enlargement.  Low voltage limb leads            No results found for: CHLPL, TRIG, HDL, LDLDIRECT  Lab Results   Component Value Date    GLUCOSE 108 (H) 08/09/2018    BUN 15 08/09/2018    CREATININE 0.64 08/09/2018     08/09/2018    K 3.7 08/09/2018     08/09/2018    CO2 24.0 08/09/2018     No results found for: HGBA1C  Lab Results   Component Value Date    WBC 6.28 08/09/2018    HGB 14.0 08/09/2018    HCT 42.7 08/09/2018     08/09/2018       Assessment:   Diagnosis Plan   1. PVC's (premature ventricular contractions)  ECG 12 Lead    Adult Transthoracic Echo Complete W/ Cont if Necessary Per Protocol       Plan:    -Currently symptomatic.    -EKG in clinic today without any ectopy.  Recent Holter monitor showed 11.8% PVCs in 24-hour.  With occasional bigeminy  -Start Toprol-XL 25 mg daily for suppression.  Will titrate up as needed.  -Obtain an echocardiogram to evaluate for any underlying structural heart disease  -Can adjust other antihypertensives as needed to increase beta-blocker  -Continue pravastatin for hyperlipidemia we will request most recent lipid panel from PCPs office  -Follow-up 6 weeks          Bear Solorzano MD

## 2019-07-02 ENCOUNTER — CONSULT (OUTPATIENT)
Dept: CARDIOLOGY | Facility: CLINIC | Age: 79
End: 2019-07-02

## 2019-07-02 VITALS
HEIGHT: 63 IN | HEART RATE: 71 BPM | OXYGEN SATURATION: 98 % | BODY MASS INDEX: 29.06 KG/M2 | SYSTOLIC BLOOD PRESSURE: 128 MMHG | WEIGHT: 164 LBS | DIASTOLIC BLOOD PRESSURE: 78 MMHG

## 2019-07-02 DIAGNOSIS — I49.3 PVC'S (PREMATURE VENTRICULAR CONTRACTIONS): Primary | ICD-10-CM

## 2019-07-02 PROCEDURE — 99204 OFFICE O/P NEW MOD 45 MIN: CPT | Performed by: INTERNAL MEDICINE

## 2019-07-02 PROCEDURE — 93000 ELECTROCARDIOGRAM COMPLETE: CPT | Performed by: INTERNAL MEDICINE

## 2019-07-02 RX ORDER — HYDROCHLOROTHIAZIDE 12.5 MG/1
12.5 CAPSULE, GELATIN COATED ORAL AS NEEDED
COMMUNITY
End: 2020-12-28

## 2019-07-02 RX ORDER — AMLODIPINE BESYLATE 10 MG/1
10 TABLET ORAL DAILY
COMMUNITY

## 2019-07-02 RX ORDER — IBUPROFEN 400 MG/1
400 TABLET ORAL AS NEEDED
COMMUNITY
End: 2021-01-05 | Stop reason: HOSPADM

## 2019-07-02 RX ORDER — CYANOCOBALAMIN (VITAMIN B-12) 500 MCG
1 LOZENGE ORAL DAILY
COMMUNITY

## 2019-07-02 RX ORDER — METOPROLOL SUCCINATE 25 MG/1
25 TABLET, EXTENDED RELEASE ORAL DAILY
Qty: 30 TABLET | Refills: 11 | Status: SHIPPED | OUTPATIENT
Start: 2019-07-02 | End: 2020-12-28

## 2019-08-19 NOTE — PROGRESS NOTES
[facility]  Follow Up Visit  Mine Curry  1940    VISIT DATE:  08/20/19    PCP:   Ten Alicea, DO  [address] [place] [address]      CC:  PVC's      Problem List:  1.  HTN  2.  HLD  3.  GERD  4.  DJD  5.  PVCs      Echo  · Left ventricular systolic function is normal. Calculated EF = 60.0%  · All left ventricular wall segments contract normally  · Normal right ventricular cavity size, wall thickness, systolic function and septal motion noted.  · No significant valve disease identified.     History of Present Illness:  Mine Curry  Is a 78 y.o.  following up for PVCs.  She had 11.8 PVCs on her recent 24-hour Holter monitor.  She was started on Toprol-XL 25 mg. EKG in primary care office showed ventricular bigeminy.  She wore a Holter monitor showing 11.8% PVCs.    Her echo was done today showing a structurally normal heart with normal ejection fraction and no significant valve disease.  The patient states she has not been taking her metoprolol on a daily basis, she is cut down on her caffeine intake and feels like her palpitations and PVCs are much improved.  I did not note any on exam today she did not have any while she was having her echocardiogram.  She otherwise feels well with her only complaint being musculoskeletal back pain.  Blood pressure is within normal limits in clinic today        Patient Active Problem List    Diagnosis Date Noted   • PVC's (premature ventricular contractions) 08/20/2019       Allergies   Allergen Reactions   • Tizanidine Shortness Of Breath   • Hydrocodone Itching   • Iodine Swelling       Social History     Socioeconomic History   • Marital status:      Spouse name: Not on file   • Number of children: Not on file   • Years of education: Not on file   • Highest education level: Not on file   Tobacco Use   • Smoking status: Never Smoker   • Smokeless tobacco: Never Used   Substance and Sexual Activity  "  • Alcohol use: Yes     Comment: DRINKS A BEER ONCE PER WEEK   • Drug use: No   • Sexual activity: Defer       Family History   Problem Relation Age of Onset   • Arthritis Mother    • Aneurysm Father    • Hypertension Father    • Stroke Father    • Arthritis Father        Current Medications:    Current Outpatient Medications:   •  amLODIPine (NORVASC) 10 MG tablet, Take 10 mg by mouth Daily., Disp: , Rfl:   •  aspirin 81 MG chewable tablet, Chew 81 mg Daily., Disp: , Rfl:   •  hydrochlorothiazide (MICROZIDE) 12.5 MG capsule, Take 12.5 mg by mouth As Needed., Disp: , Rfl:   •  IBUPROFEN PO, Take 400 mg by mouth As Needed (When not taking Naproxen)., Disp: , Rfl:   •  naproxen (NAPROSYN) 500 MG tablet, Take 500 mg by mouth Daily., Disp: , Rfl: 0  •  OMEPRAZOLE PO, Take 20 mg by mouth As Needed., Disp: , Rfl:   •  Pravastatin Sodium (PRAVACHOL PO), Take 40 mg by mouth., Disp: , Rfl:   •  VITAMIN E PO, Take 1 tablet by mouth Daily., Disp: , Rfl:   •  metoprolol succinate XL (TOPROL-XL) 25 MG 24 hr tablet, Take 1 tablet by mouth Daily., Disp: 30 tablet, Rfl: 11     Review of Systems   Cardiovascular: Negative for chest pain, dyspnea on exertion, irregular heartbeat, leg swelling and palpitations.   Musculoskeletal: Positive for back pain.   All other systems reviewed and are negative.      Vitals:    08/20/19 1034   BP: 124/72   BP Location: Left arm   Patient Position: Sitting   Pulse: 72   Weight: 75.8 kg (167 lb 3.2 oz)   Height: 161.3 cm (63.5\")       Physical Exam   Constitutional: She is oriented to person, place, and time. She appears well-developed and well-nourished.   HENT:   Head: Normocephalic and atraumatic.   Eyes: EOM are normal.   Neck: Neck supple.   Cardiovascular: Normal rate, regular rhythm and intact distal pulses.   No murmur heard.  Pulmonary/Chest: Effort normal and breath sounds normal.   Abdominal: Soft. There is no tenderness.   Musculoskeletal: Normal range of motion. She exhibits no edema. "   Neurological: She is alert and oriented to person, place, and time.       Diagnostic Data:  Procedures  No results found for: CHLPL, TRIG, HDL, LDLDIRECT  Lab Results   Component Value Date    GLUCOSE 108 (H) 08/09/2018    BUN 15 08/09/2018    CREATININE 0.64 08/09/2018     08/09/2018    K 3.7 08/09/2018     08/09/2018    CO2 24.0 08/09/2018     No results found for: HGBA1C  Lab Results   Component Value Date    WBC 6.28 08/09/2018    HGB 14.0 08/09/2018    HCT 42.7 08/09/2018     08/09/2018       Assessment:   Diagnosis Plan   1. PVC's (premature ventricular contractions)         Plan:    -Patient's echo showed a structurally normal heart, normal ejection fraction, no valve disease  -Symptoms have improved with caffeine cessation encouraged her to take her metoprolol to aid in suppression  -She will continue on pravastatin for hyperlipidemia  -He is feeling well and stable from a cardiac standpoint.  -Follow-up in 6 months        Bear Solorzano MD

## 2019-08-20 ENCOUNTER — OFFICE VISIT (OUTPATIENT)
Dept: CARDIOLOGY | Facility: CLINIC | Age: 79
End: 2019-08-20

## 2019-08-20 ENCOUNTER — HOSPITAL ENCOUNTER (OUTPATIENT)
Dept: CARDIOLOGY | Facility: HOSPITAL | Age: 79
Discharge: HOME | End: 2019-08-20
Admitting: INTERNAL MEDICINE

## 2019-08-20 VITALS — WEIGHT: 164 LBS | BODY MASS INDEX: 29.06 KG/M2 | HEIGHT: 63 IN

## 2019-08-20 VITALS
HEIGHT: 64 IN | DIASTOLIC BLOOD PRESSURE: 72 MMHG | HEART RATE: 72 BPM | SYSTOLIC BLOOD PRESSURE: 124 MMHG | WEIGHT: 167.2 LBS | BODY MASS INDEX: 28.54 KG/M2

## 2019-08-20 DIAGNOSIS — I49.3 PVC'S (PREMATURE VENTRICULAR CONTRACTIONS): Primary | ICD-10-CM

## 2019-08-20 DIAGNOSIS — I49.3 PVC'S (PREMATURE VENTRICULAR CONTRACTIONS): ICD-10-CM

## 2019-08-20 LAB
BH CV ECHO MEAS - AO MAX PG (FULL): 4.2 MMHG
BH CV ECHO MEAS - AO MAX PG: 9.8 MMHG
BH CV ECHO MEAS - AO ROOT AREA (BSA CORRECTED): 1.5
BH CV ECHO MEAS - AO ROOT AREA: 5.2 CM^2
BH CV ECHO MEAS - AO ROOT DIAM: 2.6 CM
BH CV ECHO MEAS - AO V2 MAX: 156.4 CM/SEC
BH CV ECHO MEAS - AVA(V,A): 2.3 CM^2
BH CV ECHO MEAS - AVA(V,D): 2.3 CM^2
BH CV ECHO MEAS - BSA(HAYCOCK): 1.8 M^2
BH CV ECHO MEAS - BSA: 1.8 M^2
BH CV ECHO MEAS - BZI_BMI: 29.1 KILOGRAMS/M^2
BH CV ECHO MEAS - BZI_METRIC_HEIGHT: 160 CM
BH CV ECHO MEAS - BZI_METRIC_WEIGHT: 74.4 KG
BH CV ECHO MEAS - EDV(CUBED): 94.6 ML
BH CV ECHO MEAS - EDV(MOD-SP2): 53 ML
BH CV ECHO MEAS - EDV(MOD-SP4): 70 ML
BH CV ECHO MEAS - EDV(TEICH): 95.2 ML
BH CV ECHO MEAS - EF(CUBED): 82.2 %
BH CV ECHO MEAS - EF(MOD-BP): 60 %
BH CV ECHO MEAS - EF(MOD-SP2): 62.3 %
BH CV ECHO MEAS - EF(MOD-SP4): 60 %
BH CV ECHO MEAS - EF(TEICH): 75 %
BH CV ECHO MEAS - ESV(CUBED): 16.9 ML
BH CV ECHO MEAS - ESV(MOD-SP2): 20 ML
BH CV ECHO MEAS - ESV(MOD-SP4): 28 ML
BH CV ECHO MEAS - ESV(TEICH): 23.8 ML
BH CV ECHO MEAS - FS: 43.7 %
BH CV ECHO MEAS - IVS/LVPW: 1
BH CV ECHO MEAS - IVSD: 0.93 CM
BH CV ECHO MEAS - LA DIMENSION: 3.2 CM
BH CV ECHO MEAS - LA/AO: 1.3
BH CV ECHO MEAS - LAD MAJOR: 5.2 CM
BH CV ECHO MEAS - LAT PEAK E' VEL: 10.1 CM/SEC
BH CV ECHO MEAS - LATERAL E/E' RATIO: 9.3
BH CV ECHO MEAS - LV DIASTOLIC VOL/BSA (35-75): 39.4 ML/M^2
BH CV ECHO MEAS - LV MASS(C)D: 139.9 GRAMS
BH CV ECHO MEAS - LV MASS(C)DI: 78.7 GRAMS/M^2
BH CV ECHO MEAS - LV MAX PG: 5.6 MMHG
BH CV ECHO MEAS - LV MEAN PG: 2.9 MMHG
BH CV ECHO MEAS - LV SYSTOLIC VOL/BSA (12-30): 15.8 ML/M^2
BH CV ECHO MEAS - LV V1 MAX: 117.9 CM/SEC
BH CV ECHO MEAS - LV V1 MEAN: 80.2 CM/SEC
BH CV ECHO MEAS - LV V1 VTI: 22.3 CM
BH CV ECHO MEAS - LVIDD: 4.6 CM
BH CV ECHO MEAS - LVIDS: 2.6 CM
BH CV ECHO MEAS - LVLD AP2: 6.5 CM
BH CV ECHO MEAS - LVLD AP4: 6.3 CM
BH CV ECHO MEAS - LVLS AP2: 5.3 CM
BH CV ECHO MEAS - LVLS AP4: 5.9 CM
BH CV ECHO MEAS - LVOT AREA (M): 3.1 CM^2
BH CV ECHO MEAS - LVOT AREA: 3 CM^2
BH CV ECHO MEAS - LVOT DIAM: 2 CM
BH CV ECHO MEAS - LVPWD: 0.92 CM
BH CV ECHO MEAS - MED PEAK E' VEL: 7.1 CM/SEC
BH CV ECHO MEAS - MEDIAL E/E' RATIO: 13
BH CV ECHO MEAS - MV A MAX VEL: 90.1 CM/SEC
BH CV ECHO MEAS - MV DEC TIME: 0.24 SEC
BH CV ECHO MEAS - MV E MAX VEL: 94.8 CM/SEC
BH CV ECHO MEAS - MV E/A: 1.1
BH CV ECHO MEAS - PA ACC SLOPE: 661.4 CM/SEC^2
BH CV ECHO MEAS - PA ACC TIME: 0.11 SEC
BH CV ECHO MEAS - PA MAX PG: 3.9 MMHG
BH CV ECHO MEAS - PA PR(ACCEL): 27.5 MMHG
BH CV ECHO MEAS - PA V2 MAX: 98.5 CM/SEC
BH CV ECHO MEAS - RAP SYSTOLE: 3 MMHG
BH CV ECHO MEAS - RVSP: 31 MMHG
BH CV ECHO MEAS - SI(CUBED): 43.7 ML/M^2
BH CV ECHO MEAS - SI(LVOT): 37.8 ML/M^2
BH CV ECHO MEAS - SI(MOD-SP2): 18.6 ML/M^2
BH CV ECHO MEAS - SI(MOD-SP4): 23.6 ML/M^2
BH CV ECHO MEAS - SI(TEICH): 40.2 ML/M^2
BH CV ECHO MEAS - SV(CUBED): 77.7 ML
BH CV ECHO MEAS - SV(LVOT): 67.3 ML
BH CV ECHO MEAS - SV(MOD-SP2): 33 ML
BH CV ECHO MEAS - SV(MOD-SP4): 42 ML
BH CV ECHO MEAS - SV(TEICH): 71.4 ML
BH CV ECHO MEAS - TAPSE (>1.6): 2.1 CM2
BH CV ECHO MEAS - TR MAX PG: 28 MMHG
BH CV ECHO MEAS - TR MAX VEL: 261.3 CM/SEC
BH CV ECHO MEASUREMENTS AVERAGE E/E' RATIO: 11.02
BH CV VAS BP LEFT ARM: NORMAL MMHG
BH CV XLRA - RV BASE: 3.5 CM
BH CV XLRA - RV LENGTH: 7.1 CM
BH CV XLRA - RV MID: 2.4 CM
BH CV XLRA - TDI S': 17.8 CM/SEC
MAXIMAL PREDICTED HEART RATE: 143 BPM
STRESS TARGET HR: 122 BPM

## 2019-08-20 PROCEDURE — 93306 TTE W/DOPPLER COMPLETE: CPT

## 2019-08-20 PROCEDURE — 93306 TTE W/DOPPLER COMPLETE: CPT | Performed by: INTERNAL MEDICINE

## 2019-08-20 PROCEDURE — 99213 OFFICE O/P EST LOW 20 MIN: CPT | Performed by: INTERNAL MEDICINE

## 2019-08-20 RX ORDER — NAPROXEN 500 MG/1
500 TABLET ORAL DAILY
Refills: 0 | COMMUNITY
Start: 2019-07-05 | End: 2021-01-05 | Stop reason: HOSPADM

## 2020-10-18 ENCOUNTER — APPOINTMENT (OUTPATIENT)
Dept: PREADMISSION TESTING | Facility: HOSPITAL | Age: 80
End: 2020-10-18

## 2020-10-18 PROCEDURE — U0004 COV-19 TEST NON-CDC HGH THRU: HCPCS

## 2020-10-18 PROCEDURE — C9803 HOPD COVID-19 SPEC COLLECT: HCPCS

## 2020-10-19 LAB — SARS-COV-2 RNA RESP QL NAA+PROBE: NOT DETECTED

## 2020-11-01 ENCOUNTER — APPOINTMENT (OUTPATIENT)
Dept: PREADMISSION TESTING | Facility: HOSPITAL | Age: 80
End: 2020-11-01

## 2020-11-01 PROCEDURE — U0004 COV-19 TEST NON-CDC HGH THRU: HCPCS

## 2020-11-01 PROCEDURE — C9803 HOPD COVID-19 SPEC COLLECT: HCPCS

## 2020-11-02 LAB — SARS-COV-2 RNA RESP QL NAA+PROBE: NOT DETECTED

## 2020-12-28 ENCOUNTER — APPOINTMENT (OUTPATIENT)
Dept: GENERAL RADIOLOGY | Facility: HOSPITAL | Age: 80
End: 2020-12-28

## 2020-12-28 ENCOUNTER — APPOINTMENT (OUTPATIENT)
Dept: CT IMAGING | Facility: HOSPITAL | Age: 80
End: 2020-12-28

## 2020-12-28 ENCOUNTER — HOSPITAL ENCOUNTER (INPATIENT)
Facility: HOSPITAL | Age: 80
LOS: 8 days | Discharge: NURSING, REHAB OR LONG-TERM CARE | End: 2021-01-05
Attending: EMERGENCY MEDICINE | Admitting: INTERNAL MEDICINE

## 2020-12-28 DIAGNOSIS — R06.02 SHORTNESS OF BREATH: ICD-10-CM

## 2020-12-28 DIAGNOSIS — I26.99 OTHER ACUTE PULMONARY EMBOLISM WITHOUT ACUTE COR PULMONALE (HCC): ICD-10-CM

## 2020-12-28 DIAGNOSIS — U07.1 PNEUMONIA DUE TO COVID-19 VIRUS: Primary | ICD-10-CM

## 2020-12-28 DIAGNOSIS — J12.82 PNEUMONIA DUE TO COVID-19 VIRUS: Primary | ICD-10-CM

## 2020-12-28 DIAGNOSIS — I26.93 SINGLE SUBSEGMENTAL PULMONARY EMBOLISM WITHOUT ACUTE COR PULMONALE (HCC): ICD-10-CM

## 2020-12-28 LAB
ALBUMIN SERPL-MCNC: 3.1 G/DL (ref 3.5–5.2)
ALBUMIN SERPL-MCNC: 3.6 G/DL (ref 3.5–5.2)
ALBUMIN/GLOB SERPL: 1.1 G/DL
ALP SERPL-CCNC: 105 U/L (ref 39–117)
ALP SERPL-CCNC: 110 U/L (ref 39–117)
ALT SERPL W P-5'-P-CCNC: 63 U/L (ref 1–33)
ALT SERPL W P-5'-P-CCNC: 80 U/L (ref 1–33)
ANION GAP SERPL CALCULATED.3IONS-SCNC: 13 MMOL/L (ref 5–15)
APTT PPP: 30.5 SECONDS (ref 50–95)
AST SERPL-CCNC: 41 U/L (ref 1–32)
AST SERPL-CCNC: 51 U/L (ref 1–32)
B PARAPERT DNA SPEC QL NAA+PROBE: NOT DETECTED
B PERT DNA SPEC QL NAA+PROBE: NOT DETECTED
BACTERIA UR QL AUTO: NORMAL /HPF
BASOPHILS # BLD AUTO: 0.03 10*3/MM3 (ref 0–0.2)
BASOPHILS # BLD AUTO: 0.03 10*3/MM3 (ref 0–0.2)
BASOPHILS NFR BLD AUTO: 0.3 % (ref 0–1.5)
BASOPHILS NFR BLD AUTO: 0.4 % (ref 0–1.5)
BILIRUB CONJ SERPL-MCNC: <0.2 MG/DL (ref 0–0.3)
BILIRUB INDIRECT SERPL-MCNC: ABNORMAL MG/DL
BILIRUB SERPL-MCNC: 0.3 MG/DL (ref 0–1.2)
BILIRUB SERPL-MCNC: 0.5 MG/DL (ref 0–1.2)
BILIRUB UR QL STRIP: NEGATIVE
BUN SERPL-MCNC: 10 MG/DL (ref 8–23)
BUN/CREAT SERPL: 18.5 (ref 7–25)
C PNEUM DNA NPH QL NAA+NON-PROBE: NOT DETECTED
CALCIUM SPEC-SCNC: 9.3 MG/DL (ref 8.6–10.5)
CHLORIDE SERPL-SCNC: 108 MMOL/L (ref 98–107)
CLARITY UR: CLEAR
CO2 SERPL-SCNC: 24 MMOL/L (ref 22–29)
COLOR UR: YELLOW
CREAT SERPL-MCNC: 0.54 MG/DL (ref 0.57–1)
CREAT SERPL-MCNC: 0.61 MG/DL (ref 0.57–1)
D DIMER PPP FEU-MCNC: 6.66 MCGFEU/ML (ref 0–0.56)
D DIMER PPP FEU-MCNC: 6.75 MCGFEU/ML (ref 0–0.56)
D-LACTATE SERPL-SCNC: 1.2 MMOL/L (ref 0.5–2)
DEPRECATED RDW RBC AUTO: 45.1 FL (ref 37–54)
DEPRECATED RDW RBC AUTO: 46.1 FL (ref 37–54)
EOSINOPHIL # BLD AUTO: 0.02 10*3/MM3 (ref 0–0.4)
EOSINOPHIL # BLD AUTO: 0.07 10*3/MM3 (ref 0–0.4)
EOSINOPHIL NFR BLD AUTO: 0.2 % (ref 0.3–6.2)
EOSINOPHIL NFR BLD AUTO: 0.9 % (ref 0.3–6.2)
ERYTHROCYTE [DISTWIDTH] IN BLOOD BY AUTOMATED COUNT: 13.3 % (ref 12.3–15.4)
ERYTHROCYTE [DISTWIDTH] IN BLOOD BY AUTOMATED COUNT: 13.5 % (ref 12.3–15.4)
FERRITIN SERPL-MCNC: 296.1 NG/ML (ref 13–150)
FLUAV H1 2009 PAND RNA NPH QL NAA+PROBE: NOT DETECTED
FLUAV H1 HA GENE NPH QL NAA+PROBE: NOT DETECTED
FLUAV H3 RNA NPH QL NAA+PROBE: NOT DETECTED
FLUAV SUBTYP SPEC NAA+PROBE: NOT DETECTED
FLUBV RNA ISLT QL NAA+PROBE: NOT DETECTED
GFR SERPL CREATININE-BSD FRML MDRD: 109 ML/MIN/1.73
GFR SERPL CREATININE-BSD FRML MDRD: 95 ML/MIN/1.73
GLOBULIN UR ELPH-MCNC: 3.3 GM/DL
GLUCOSE SERPL-MCNC: 115 MG/DL (ref 65–99)
GLUCOSE UR STRIP-MCNC: NEGATIVE MG/DL
HADV DNA SPEC NAA+PROBE: NOT DETECTED
HCOV 229E RNA SPEC QL NAA+PROBE: NOT DETECTED
HCOV HKU1 RNA SPEC QL NAA+PROBE: NOT DETECTED
HCOV NL63 RNA SPEC QL NAA+PROBE: NOT DETECTED
HCOV OC43 RNA SPEC QL NAA+PROBE: NOT DETECTED
HCT VFR BLD AUTO: 38.3 % (ref 34–46.6)
HCT VFR BLD AUTO: 39.1 % (ref 34–46.6)
HGB BLD-MCNC: 12.2 G/DL (ref 12–15.9)
HGB BLD-MCNC: 12.7 G/DL (ref 12–15.9)
HGB UR QL STRIP.AUTO: NEGATIVE
HMPV RNA NPH QL NAA+NON-PROBE: NOT DETECTED
HOLD SPECIMEN: NORMAL
HOLD SPECIMEN: NORMAL
HPIV1 RNA SPEC QL NAA+PROBE: NOT DETECTED
HPIV2 RNA SPEC QL NAA+PROBE: NOT DETECTED
HPIV3 RNA NPH QL NAA+PROBE: NOT DETECTED
HPIV4 P GENE NPH QL NAA+PROBE: NOT DETECTED
HYALINE CASTS UR QL AUTO: NORMAL /LPF
IMM GRANULOCYTES # BLD AUTO: 0.12 10*3/MM3 (ref 0–0.05)
IMM GRANULOCYTES # BLD AUTO: 0.14 10*3/MM3 (ref 0–0.05)
IMM GRANULOCYTES NFR BLD AUTO: 1.5 % (ref 0–0.5)
IMM GRANULOCYTES NFR BLD AUTO: 1.5 % (ref 0–0.5)
INR PPP: 1.18 (ref 0.85–1.16)
KETONES UR QL STRIP: ABNORMAL
LDH SERPL-CCNC: 362 U/L (ref 135–214)
LDH SERPL-CCNC: 400 U/L (ref 135–214)
LEUKOCYTE ESTERASE UR QL STRIP.AUTO: ABNORMAL
LYMPHOCYTES # BLD AUTO: 0.64 10*3/MM3 (ref 0.7–3.1)
LYMPHOCYTES # BLD AUTO: 0.8 10*3/MM3 (ref 0.7–3.1)
LYMPHOCYTES NFR BLD AUTO: 7 % (ref 19.6–45.3)
LYMPHOCYTES NFR BLD AUTO: 9.8 % (ref 19.6–45.3)
M PNEUMO IGG SER IA-ACNC: NOT DETECTED
MCH RBC QN AUTO: 29 PG (ref 26.6–33)
MCH RBC QN AUTO: 29.9 PG (ref 26.6–33)
MCHC RBC AUTO-ENTMCNC: 31.9 G/DL (ref 31.5–35.7)
MCHC RBC AUTO-ENTMCNC: 32.5 G/DL (ref 31.5–35.7)
MCV RBC AUTO: 91 FL (ref 79–97)
MCV RBC AUTO: 92 FL (ref 79–97)
MONOCYTES # BLD AUTO: 0.43 10*3/MM3 (ref 0.1–0.9)
MONOCYTES # BLD AUTO: 0.92 10*3/MM3 (ref 0.1–0.9)
MONOCYTES NFR BLD AUTO: 11.2 % (ref 5–12)
MONOCYTES NFR BLD AUTO: 4.7 % (ref 5–12)
NEUTROPHILS NFR BLD AUTO: 6.26 10*3/MM3 (ref 1.7–7)
NEUTROPHILS NFR BLD AUTO: 7.89 10*3/MM3 (ref 1.7–7)
NEUTROPHILS NFR BLD AUTO: 76.2 % (ref 42.7–76)
NEUTROPHILS NFR BLD AUTO: 86.3 % (ref 42.7–76)
NITRITE UR QL STRIP: NEGATIVE
NRBC BLD AUTO-RTO: 0 /100 WBC (ref 0–0.2)
NRBC BLD AUTO-RTO: 0 /100 WBC (ref 0–0.2)
NT-PROBNP SERPL-MCNC: 182.9 PG/ML (ref 0–1800)
PH UR STRIP.AUTO: 7 [PH] (ref 5–8)
PLATELET # BLD AUTO: 326 10*3/MM3 (ref 140–450)
PLATELET # BLD AUTO: 329 10*3/MM3 (ref 140–450)
PMV BLD AUTO: 9.6 FL (ref 6–12)
PMV BLD AUTO: 9.9 FL (ref 6–12)
POTASSIUM SERPL-SCNC: 3.5 MMOL/L (ref 3.5–5.2)
PROCALCITONIN SERPL-MCNC: 0.04 NG/ML (ref 0–0.25)
PROCALCITONIN SERPL-MCNC: 0.05 NG/ML (ref 0–0.25)
PROT SERPL-MCNC: 6.3 G/DL (ref 6–8.5)
PROT SERPL-MCNC: 6.9 G/DL (ref 6–8.5)
PROT UR QL STRIP: NEGATIVE
PROTHROMBIN TIME: 14.7 SECONDS (ref 11.5–14)
RBC # BLD AUTO: 4.21 10*6/MM3 (ref 3.77–5.28)
RBC # BLD AUTO: 4.25 10*6/MM3 (ref 3.77–5.28)
RBC # UR: NORMAL /HPF
REF LAB TEST METHOD: NORMAL
RHINOVIRUS RNA SPEC NAA+PROBE: NOT DETECTED
RSV RNA NPH QL NAA+NON-PROBE: NOT DETECTED
SARS-COV-2 RNA NPH QL NAA+NON-PROBE: DETECTED
SODIUM SERPL-SCNC: 145 MMOL/L (ref 136–145)
SP GR UR STRIP: <=1.005 (ref 1–1.03)
SQUAMOUS #/AREA URNS HPF: NORMAL /HPF
TROPONIN T SERPL-MCNC: <0.01 NG/ML (ref 0–0.03)
UFH PPP CHRO-ACNC: 0.1 IU/ML (ref 0.3–0.7)
UROBILINOGEN UR QL STRIP: ABNORMAL
WBC # BLD AUTO: 8.2 10*3/MM3 (ref 3.4–10.8)
WBC # BLD AUTO: 9.15 10*3/MM3 (ref 3.4–10.8)
WBC UR QL AUTO: NORMAL /HPF
WHOLE BLOOD HOLD SPECIMEN: NORMAL
WHOLE BLOOD HOLD SPECIMEN: NORMAL

## 2020-12-28 PROCEDURE — 99285 EMERGENCY DEPT VISIT HI MDM: CPT

## 2020-12-28 PROCEDURE — 84145 PROCALCITONIN (PCT): CPT | Performed by: PHYSICIAN ASSISTANT

## 2020-12-28 PROCEDURE — 80076 HEPATIC FUNCTION PANEL: CPT | Performed by: NURSE PRACTITIONER

## 2020-12-28 PROCEDURE — 71045 X-RAY EXAM CHEST 1 VIEW: CPT

## 2020-12-28 PROCEDURE — 85379 FIBRIN DEGRADATION QUANT: CPT | Performed by: PHYSICIAN ASSISTANT

## 2020-12-28 PROCEDURE — 93005 ELECTROCARDIOGRAM TRACING: CPT

## 2020-12-28 PROCEDURE — 81001 URINALYSIS AUTO W/SCOPE: CPT | Performed by: PHYSICIAN ASSISTANT

## 2020-12-28 PROCEDURE — 0202U NFCT DS 22 TRGT SARS-COV-2: CPT | Performed by: PHYSICIAN ASSISTANT

## 2020-12-28 PROCEDURE — 85610 PROTHROMBIN TIME: CPT | Performed by: PHYSICIAN ASSISTANT

## 2020-12-28 PROCEDURE — 87040 BLOOD CULTURE FOR BACTERIA: CPT | Performed by: PHYSICIAN ASSISTANT

## 2020-12-28 PROCEDURE — 82565 ASSAY OF CREATININE: CPT | Performed by: NURSE PRACTITIONER

## 2020-12-28 PROCEDURE — 83605 ASSAY OF LACTIC ACID: CPT | Performed by: PHYSICIAN ASSISTANT

## 2020-12-28 PROCEDURE — 25010000002 AZITHROMYCIN PER 500 MG: Performed by: PHYSICIAN ASSISTANT

## 2020-12-28 PROCEDURE — 80053 COMPREHEN METABOLIC PANEL: CPT | Performed by: EMERGENCY MEDICINE

## 2020-12-28 PROCEDURE — 93005 ELECTROCARDIOGRAM TRACING: CPT | Performed by: EMERGENCY MEDICINE

## 2020-12-28 PROCEDURE — 84484 ASSAY OF TROPONIN QUANT: CPT | Performed by: EMERGENCY MEDICINE

## 2020-12-28 PROCEDURE — 25010000002 HEPARIN (PORCINE) 25000-0.45 UT/250ML-% SOLUTION: Performed by: PHYSICIAN ASSISTANT

## 2020-12-28 PROCEDURE — 25010000002 HEPARIN (PORCINE) PER 1000 UNITS: Performed by: PHYSICIAN ASSISTANT

## 2020-12-28 PROCEDURE — 25010000002 DEXAMETHASONE PER 1 MG: Performed by: PHYSICIAN ASSISTANT

## 2020-12-28 PROCEDURE — 83615 LACTATE (LD) (LDH) ENZYME: CPT | Performed by: NURSE PRACTITIONER

## 2020-12-28 PROCEDURE — 85025 COMPLETE CBC W/AUTO DIFF WBC: CPT | Performed by: PHYSICIAN ASSISTANT

## 2020-12-28 PROCEDURE — 25010000002 DIPHENHYDRAMINE PER 50 MG: Performed by: PHYSICIAN ASSISTANT

## 2020-12-28 PROCEDURE — 85730 THROMBOPLASTIN TIME PARTIAL: CPT | Performed by: PHYSICIAN ASSISTANT

## 2020-12-28 PROCEDURE — 0 IOPAMIDOL PER 1 ML: Performed by: EMERGENCY MEDICINE

## 2020-12-28 PROCEDURE — 71275 CT ANGIOGRAPHY CHEST: CPT

## 2020-12-28 PROCEDURE — 85520 HEPARIN ASSAY: CPT | Performed by: NURSE PRACTITIONER

## 2020-12-28 PROCEDURE — 84145 PROCALCITONIN (PCT): CPT | Performed by: NURSE PRACTITIONER

## 2020-12-28 PROCEDURE — XW033E5 INTRODUCTION OF REMDESIVIR ANTI-INFECTIVE INTO PERIPHERAL VEIN, PERCUTANEOUS APPROACH, NEW TECHNOLOGY GROUP 5: ICD-10-PCS | Performed by: INTERNAL MEDICINE

## 2020-12-28 PROCEDURE — 85379 FIBRIN DEGRADATION QUANT: CPT | Performed by: NURSE PRACTITIONER

## 2020-12-28 PROCEDURE — 83615 LACTATE (LD) (LDH) ENZYME: CPT | Performed by: PHYSICIAN ASSISTANT

## 2020-12-28 PROCEDURE — 25010000002 CEFTRIAXONE PER 250 MG: Performed by: PHYSICIAN ASSISTANT

## 2020-12-28 PROCEDURE — 82728 ASSAY OF FERRITIN: CPT | Performed by: NURSE PRACTITIONER

## 2020-12-28 PROCEDURE — 99223 1ST HOSP IP/OBS HIGH 75: CPT | Performed by: INTERNAL MEDICINE

## 2020-12-28 PROCEDURE — 83880 ASSAY OF NATRIURETIC PEPTIDE: CPT | Performed by: EMERGENCY MEDICINE

## 2020-12-28 PROCEDURE — 85025 COMPLETE CBC W/AUTO DIFF WBC: CPT | Performed by: EMERGENCY MEDICINE

## 2020-12-28 PROCEDURE — 85520 HEPARIN ASSAY: CPT | Performed by: PHYSICIAN ASSISTANT

## 2020-12-28 RX ORDER — ACETAMINOPHEN 325 MG/1
650 TABLET ORAL EVERY 4 HOURS PRN
Status: DISCONTINUED | OUTPATIENT
Start: 2020-12-28 | End: 2021-01-05 | Stop reason: HOSPADM

## 2020-12-28 RX ORDER — ACETAMINOPHEN 500 MG
1000 TABLET ORAL ONCE
Status: COMPLETED | OUTPATIENT
Start: 2020-12-28 | End: 2020-12-28

## 2020-12-28 RX ORDER — SODIUM CHLORIDE 9 MG/ML
100 INJECTION, SOLUTION INTRAVENOUS CONTINUOUS
Status: DISCONTINUED | OUTPATIENT
Start: 2020-12-28 | End: 2020-12-28

## 2020-12-28 RX ORDER — BACLOFEN 10 MG/1
10 TABLET ORAL 3 TIMES DAILY PRN
Status: DISCONTINUED | OUTPATIENT
Start: 2020-12-28 | End: 2021-01-05 | Stop reason: HOSPADM

## 2020-12-28 RX ORDER — PROCHLORPERAZINE MALEATE 5 MG/1
5 TABLET ORAL EVERY 6 HOURS PRN
Status: DISCONTINUED | OUTPATIENT
Start: 2020-12-28 | End: 2021-01-05 | Stop reason: HOSPADM

## 2020-12-28 RX ORDER — ALBUTEROL SULFATE 90 UG/1
2 AEROSOL, METERED RESPIRATORY (INHALATION) EVERY 6 HOURS PRN
Status: DISCONTINUED | OUTPATIENT
Start: 2020-12-28 | End: 2021-01-05 | Stop reason: HOSPADM

## 2020-12-28 RX ORDER — SODIUM CHLORIDE 0.9 % (FLUSH) 0.9 %
10 SYRINGE (ML) INJECTION AS NEEDED
Status: DISCONTINUED | OUTPATIENT
Start: 2020-12-28 | End: 2021-01-05 | Stop reason: HOSPADM

## 2020-12-28 RX ORDER — PROCHLORPERAZINE 25 MG
25 SUPPOSITORY, RECTAL RECTAL EVERY 12 HOURS PRN
Status: DISCONTINUED | OUTPATIENT
Start: 2020-12-28 | End: 2021-01-05 | Stop reason: HOSPADM

## 2020-12-28 RX ORDER — SODIUM CHLORIDE 0.9 % (FLUSH) 0.9 %
10 SYRINGE (ML) INJECTION EVERY 12 HOURS SCHEDULED
Status: DISCONTINUED | OUTPATIENT
Start: 2020-12-28 | End: 2021-01-05 | Stop reason: HOSPADM

## 2020-12-28 RX ORDER — DEXTROMETHORPHAN POLISTIREX 30 MG/5ML
60 SUSPENSION ORAL EVERY 12 HOURS PRN
Status: DISCONTINUED | OUTPATIENT
Start: 2020-12-28 | End: 2021-01-05 | Stop reason: HOSPADM

## 2020-12-28 RX ORDER — AMLODIPINE BESYLATE 10 MG/1
10 TABLET ORAL DAILY
Status: DISCONTINUED | OUTPATIENT
Start: 2020-12-29 | End: 2021-01-05 | Stop reason: HOSPADM

## 2020-12-28 RX ORDER — PANTOPRAZOLE SODIUM 40 MG/1
40 TABLET, DELAYED RELEASE ORAL EVERY MORNING
Status: DISCONTINUED | OUTPATIENT
Start: 2020-12-29 | End: 2021-01-05 | Stop reason: HOSPADM

## 2020-12-28 RX ORDER — DEXAMETHASONE SODIUM PHOSPHATE 4 MG/ML
6 INJECTION, SOLUTION INTRA-ARTICULAR; INTRALESIONAL; INTRAMUSCULAR; INTRAVENOUS; SOFT TISSUE EVERY 4 HOURS
Status: DISCONTINUED | OUTPATIENT
Start: 2020-12-28 | End: 2020-12-28

## 2020-12-28 RX ORDER — HEPARIN SODIUM 1000 [USP'U]/ML
80 INJECTION, SOLUTION INTRAVENOUS; SUBCUTANEOUS ONCE
Status: COMPLETED | OUTPATIENT
Start: 2020-12-28 | End: 2020-12-28

## 2020-12-28 RX ORDER — HEPARIN SODIUM 1000 [USP'U]/ML
40 INJECTION, SOLUTION INTRAVENOUS; SUBCUTANEOUS AS NEEDED
Status: DISCONTINUED | OUTPATIENT
Start: 2020-12-28 | End: 2020-12-28

## 2020-12-28 RX ORDER — ASPIRIN 81 MG/1
81 TABLET, CHEWABLE ORAL DAILY
Status: DISCONTINUED | OUTPATIENT
Start: 2020-12-29 | End: 2021-01-05 | Stop reason: HOSPADM

## 2020-12-28 RX ORDER — DIPHENHYDRAMINE HYDROCHLORIDE 50 MG/ML
25 INJECTION INTRAMUSCULAR; INTRAVENOUS ONCE
Status: COMPLETED | OUTPATIENT
Start: 2020-12-28 | End: 2020-12-28

## 2020-12-28 RX ORDER — PROCHLORPERAZINE EDISYLATE 5 MG/ML
5 INJECTION INTRAMUSCULAR; INTRAVENOUS EVERY 6 HOURS PRN
Status: DISCONTINUED | OUTPATIENT
Start: 2020-12-28 | End: 2021-01-05 | Stop reason: HOSPADM

## 2020-12-28 RX ORDER — HEPARIN SODIUM 1000 [USP'U]/ML
80 INJECTION, SOLUTION INTRAVENOUS; SUBCUTANEOUS AS NEEDED
Status: DISCONTINUED | OUTPATIENT
Start: 2020-12-28 | End: 2020-12-28

## 2020-12-28 RX ORDER — BACLOFEN 10 MG/1
10 TABLET ORAL 3 TIMES DAILY PRN
COMMUNITY

## 2020-12-28 RX ORDER — TRAMADOL HYDROCHLORIDE 50 MG/1
50 TABLET ORAL EVERY 6 HOURS PRN
Status: DISPENSED | OUTPATIENT
Start: 2020-12-28 | End: 2021-01-04

## 2020-12-28 RX ORDER — DEXAMETHASONE SODIUM PHOSPHATE 4 MG/ML
6 INJECTION, SOLUTION INTRA-ARTICULAR; INTRALESIONAL; INTRAMUSCULAR; INTRAVENOUS; SOFT TISSUE DAILY
Status: DISCONTINUED | OUTPATIENT
Start: 2020-12-29 | End: 2020-12-29

## 2020-12-28 RX ORDER — PRAVASTATIN SODIUM 40 MG
40 TABLET ORAL NIGHTLY
Status: DISCONTINUED | OUTPATIENT
Start: 2020-12-28 | End: 2020-12-28

## 2020-12-28 RX ORDER — HEPARIN SODIUM 10000 [USP'U]/100ML
18 INJECTION, SOLUTION INTRAVENOUS
Status: DISCONTINUED | OUTPATIENT
Start: 2020-12-28 | End: 2020-12-30

## 2020-12-28 RX ADMIN — DIPHENHYDRAMINE HYDROCHLORIDE 25 MG: 50 INJECTION INTRAMUSCULAR; INTRAVENOUS at 14:16

## 2020-12-28 RX ADMIN — HEPARIN SODIUM 6020 UNITS: 1000 INJECTION, SOLUTION INTRAVENOUS; SUBCUTANEOUS at 16:45

## 2020-12-28 RX ADMIN — DEXAMETHASONE SODIUM PHOSPHATE 6 MG: 4 INJECTION, SOLUTION INTRA-ARTICULAR; INTRALESIONAL; INTRAMUSCULAR; INTRAVENOUS; SOFT TISSUE at 14:17

## 2020-12-28 RX ADMIN — ACETAMINOPHEN 1000 MG: 500 TABLET ORAL at 14:17

## 2020-12-28 RX ADMIN — REMDESIVIR 200 MG: 100 INJECTION, POWDER, LYOPHILIZED, FOR SOLUTION INTRAVENOUS at 21:34

## 2020-12-28 RX ADMIN — SODIUM CHLORIDE 100 ML/HR: 9 INJECTION, SOLUTION INTRAVENOUS at 20:03

## 2020-12-28 RX ADMIN — PRAVASTATIN SODIUM 40 MG: 40 TABLET ORAL at 20:04

## 2020-12-28 RX ADMIN — CEFTRIAXONE SODIUM 1 G: 1 INJECTION, POWDER, FOR SOLUTION INTRAMUSCULAR; INTRAVENOUS at 11:35

## 2020-12-28 RX ADMIN — BACLOFEN 10 MG: 10 TABLET ORAL at 23:21

## 2020-12-28 RX ADMIN — HEPARIN SODIUM 18 UNITS/KG/HR: 10000 INJECTION, SOLUTION INTRAVENOUS at 16:46

## 2020-12-28 RX ADMIN — IOPAMIDOL 63 ML: 755 INJECTION, SOLUTION INTRAVENOUS at 15:16

## 2020-12-28 RX ADMIN — SODIUM CHLORIDE, PRESERVATIVE FREE 10 ML: 5 INJECTION INTRAVENOUS at 20:00

## 2020-12-28 RX ADMIN — TRAMADOL HYDROCHLORIDE 50 MG: 50 TABLET, COATED ORAL at 21:59

## 2020-12-28 RX ADMIN — AZITHROMYCIN 500 MG: 500 INJECTION, POWDER, LYOPHILIZED, FOR SOLUTION INTRAVENOUS at 12:05

## 2020-12-29 ENCOUNTER — APPOINTMENT (OUTPATIENT)
Dept: CARDIOLOGY | Facility: HOSPITAL | Age: 80
End: 2020-12-29

## 2020-12-29 LAB
ALBUMIN SERPL-MCNC: 3.3 G/DL (ref 3.5–5.2)
ALP SERPL-CCNC: 104 U/L (ref 39–117)
ALT SERPL W P-5'-P-CCNC: 59 U/L (ref 1–33)
AST SERPL-CCNC: 34 U/L (ref 1–32)
BASOPHILS # BLD AUTO: 0.02 10*3/MM3 (ref 0–0.2)
BASOPHILS NFR BLD AUTO: 0.2 % (ref 0–1.5)
BH CV ECHO MEAS - AO MAX PG (FULL): 5.5 MMHG
BH CV ECHO MEAS - AO MAX PG: 13 MMHG
BH CV ECHO MEAS - AO MEAN PG (FULL): 2.6 MMHG
BH CV ECHO MEAS - AO MEAN PG: 6 MMHG
BH CV ECHO MEAS - AO ROOT AREA (BSA CORRECTED): 2.1
BH CV ECHO MEAS - AO ROOT AREA: 7.4 CM^2
BH CV ECHO MEAS - AO ROOT DIAM: 3.1 CM
BH CV ECHO MEAS - AO V2 MAX: 180.5 CM/SEC
BH CV ECHO MEAS - AO V2 MEAN: 110.6 CM/SEC
BH CV ECHO MEAS - AO V2 VTI: 38.6 CM
BH CV ECHO MEAS - AVA(I,A): 2.3 CM^2
BH CV ECHO MEAS - AVA(I,D): 2.3 CM^2
BH CV ECHO MEAS - AVA(V,A): 2.3 CM^2
BH CV ECHO MEAS - AVA(V,D): 2.3 CM^2
BH CV ECHO MEAS - BSA(HAYCOCK): 1.7 M^2
BH CV ECHO MEAS - BSA: 1.5 M^2
BH CV ECHO MEAS - BZI_BMI: 50.7 KILOGRAMS/M^2
BH CV ECHO MEAS - BZI_METRIC_HEIGHT: 121.9 CM
BH CV ECHO MEAS - BZI_METRIC_WEIGHT: 75.3 KG
BH CV ECHO MEAS - EDV(CUBED): 97.6 ML
BH CV ECHO MEAS - EDV(MOD-SP2): 32 ML
BH CV ECHO MEAS - EDV(MOD-SP4): 53 ML
BH CV ECHO MEAS - EDV(TEICH): 97.5 ML
BH CV ECHO MEAS - EF(CUBED): 88.9 %
BH CV ECHO MEAS - EF(MOD-SP2): 75 %
BH CV ECHO MEAS - EF(MOD-SP4): 88.7 %
BH CV ECHO MEAS - EF(TEICH): 83.1 %
BH CV ECHO MEAS - ESV(CUBED): 10.9 ML
BH CV ECHO MEAS - ESV(MOD-SP2): 8 ML
BH CV ECHO MEAS - ESV(MOD-SP4): 6 ML
BH CV ECHO MEAS - ESV(TEICH): 16.5 ML
BH CV ECHO MEAS - FS: 51.9 %
BH CV ECHO MEAS - IVS/LVPW: 1
BH CV ECHO MEAS - IVSD: 1 CM
BH CV ECHO MEAS - LA DIMENSION: 3 CM
BH CV ECHO MEAS - LA/AO: 0.98
BH CV ECHO MEAS - LAD MAJOR: 4.9 CM
BH CV ECHO MEAS - LAT PEAK E' VEL: 7.2 CM/SEC
BH CV ECHO MEAS - LATERAL E/E' RATIO: 12
BH CV ECHO MEAS - LV DIASTOLIC VOL/BSA (35-75): 36.1 ML/M^2
BH CV ECHO MEAS - LV MASS(C)D: 163 GRAMS
BH CV ECHO MEAS - LV MASS(C)DI: 111.1 GRAMS/M^2
BH CV ECHO MEAS - LV MAX PG: 7.6 MMHG
BH CV ECHO MEAS - LV MEAN PG: 3.4 MMHG
BH CV ECHO MEAS - LV SYSTOLIC VOL/BSA (12-30): 4.1 ML/M^2
BH CV ECHO MEAS - LV V1 MAX: 137.6 CM/SEC
BH CV ECHO MEAS - LV V1 MEAN: 83.9 CM/SEC
BH CV ECHO MEAS - LV V1 VTI: 29.7 CM
BH CV ECHO MEAS - LVIDD: 4.6 CM
BH CV ECHO MEAS - LVIDS: 2.2 CM
BH CV ECHO MEAS - LVLD AP2: 6.4 CM
BH CV ECHO MEAS - LVLD AP4: 6.3 CM
BH CV ECHO MEAS - LVLS AP2: 5.4 CM
BH CV ECHO MEAS - LVLS AP4: 5 CM
BH CV ECHO MEAS - LVOT AREA (M): 2.8 CM^2
BH CV ECHO MEAS - LVOT AREA: 3 CM^2
BH CV ECHO MEAS - LVOT DIAM: 1.9 CM
BH CV ECHO MEAS - LVPWD: 1 CM
BH CV ECHO MEAS - MED PEAK E' VEL: 6.6 CM/SEC
BH CV ECHO MEAS - MEDIAL E/E' RATIO: 13.1
BH CV ECHO MEAS - MV A MAX VEL: 90.1 CM/SEC
BH CV ECHO MEAS - MV DEC TIME: 0.2 SEC
BH CV ECHO MEAS - MV E MAX VEL: 87.9 CM/SEC
BH CV ECHO MEAS - MV E/A: 0.98
BH CV ECHO MEAS - PA ACC SLOPE: 730.2 CM/SEC^2
BH CV ECHO MEAS - PA ACC TIME: 0.11 SEC
BH CV ECHO MEAS - PA PR(ACCEL): 28.3 MMHG
BH CV ECHO MEAS - RAP SYSTOLE: 3 MMHG
BH CV ECHO MEAS - RVSP: 49 MMHG
BH CV ECHO MEAS - SI(AO): 195.5 ML/M^2
BH CV ECHO MEAS - SI(CUBED): 59.1 ML/M^2
BH CV ECHO MEAS - SI(LVOT): 59.9 ML/M^2
BH CV ECHO MEAS - SI(MOD-SP2): 16.4 ML/M^2
BH CV ECHO MEAS - SI(MOD-SP4): 32 ML/M^2
BH CV ECHO MEAS - SI(TEICH): 55.3 ML/M^2
BH CV ECHO MEAS - SV(AO): 286.8 ML
BH CV ECHO MEAS - SV(CUBED): 86.7 ML
BH CV ECHO MEAS - SV(LVOT): 87.8 ML
BH CV ECHO MEAS - SV(MOD-SP2): 24 ML
BH CV ECHO MEAS - SV(MOD-SP4): 47 ML
BH CV ECHO MEAS - SV(TEICH): 81.1 ML
BH CV ECHO MEAS - TAPSE (>1.6): 2.7 CM
BH CV ECHO MEAS - TR MAX PG: 46 MMHG
BH CV ECHO MEAS - TR MAX VEL: 338 CM/SEC
BH CV ECHO MEASUREMENTS AVERAGE E/E' RATIO: 12.74
BH CV VAS BP LEFT ARM: NORMAL MMHG
BH CV XLRA - RV BASE: 3.6 CM
BH CV XLRA - RV LENGTH: 7.3 CM
BH CV XLRA - RV MID: 3.5 CM
BH CV XLRA - TDI S': 16.9 CM/SEC
BILIRUB CONJ SERPL-MCNC: <0.2 MG/DL (ref 0–0.3)
BILIRUB INDIRECT SERPL-MCNC: ABNORMAL MG/DL
BILIRUB SERPL-MCNC: 0.3 MG/DL (ref 0–1.2)
CK SERPL-CCNC: 73 U/L (ref 20–180)
CREAT SERPL-MCNC: 0.56 MG/DL (ref 0.57–1)
CRP SERPL-MCNC: 7.86 MG/DL (ref 0–0.5)
D DIMER PPP FEU-MCNC: 4.4 MCGFEU/ML (ref 0–0.56)
DEPRECATED RDW RBC AUTO: 45.2 FL (ref 37–54)
EOSINOPHIL # BLD AUTO: 0 10*3/MM3 (ref 0–0.4)
EOSINOPHIL NFR BLD AUTO: 0 % (ref 0.3–6.2)
ERYTHROCYTE [DISTWIDTH] IN BLOOD BY AUTOMATED COUNT: 13.3 % (ref 12.3–15.4)
FERRITIN SERPL-MCNC: 291.5 NG/ML (ref 13–150)
FIBRINOGEN PPP-MCNC: 588 MG/DL (ref 215–430)
GFR SERPL CREATININE-BSD FRML MDRD: 104 ML/MIN/1.73
HCT VFR BLD AUTO: 38.3 % (ref 34–46.6)
HGB BLD-MCNC: 12.3 G/DL (ref 12–15.9)
IMM GRANULOCYTES # BLD AUTO: 0.26 10*3/MM3 (ref 0–0.05)
IMM GRANULOCYTES NFR BLD AUTO: 2.4 % (ref 0–0.5)
LDH SERPL-CCNC: 444 U/L (ref 135–214)
LEFT ATRIUM VOLUME INDEX: 23.9 ML/M^2
LEFT ATRIUM VOLUME: 35 ML
LYMPHOCYTES # BLD AUTO: 1.07 10*3/MM3 (ref 0.7–3.1)
LYMPHOCYTES NFR BLD AUTO: 9.7 % (ref 19.6–45.3)
MAXIMAL PREDICTED HEART RATE: 141 BPM
MCH RBC QN AUTO: 29.7 PG (ref 26.6–33)
MCHC RBC AUTO-ENTMCNC: 32.1 G/DL (ref 31.5–35.7)
MCV RBC AUTO: 92.5 FL (ref 79–97)
MONOCYTES # BLD AUTO: 0.93 10*3/MM3 (ref 0.1–0.9)
MONOCYTES NFR BLD AUTO: 8.5 % (ref 5–12)
NEUTROPHILS NFR BLD AUTO: 79.2 % (ref 42.7–76)
NEUTROPHILS NFR BLD AUTO: 8.7 10*3/MM3 (ref 1.7–7)
NRBC BLD AUTO-RTO: 0 /100 WBC (ref 0–0.2)
PLATELET # BLD AUTO: 395 10*3/MM3 (ref 140–450)
PMV BLD AUTO: 10.5 FL (ref 6–12)
PROT SERPL-MCNC: 6.8 G/DL (ref 6–8.5)
RBC # BLD AUTO: 4.14 10*6/MM3 (ref 3.77–5.28)
STRESS TARGET HR: 120 BPM
UFH PPP CHRO-ACNC: 0.42 IU/ML (ref 0.3–0.7)
UFH PPP CHRO-ACNC: 0.5 IU/ML (ref 0.3–0.7)
WBC # BLD AUTO: 10.98 10*3/MM3 (ref 3.4–10.8)

## 2020-12-29 PROCEDURE — 82550 ASSAY OF CK (CPK): CPT | Performed by: NURSE PRACTITIONER

## 2020-12-29 PROCEDURE — 85379 FIBRIN DEGRADATION QUANT: CPT | Performed by: NURSE PRACTITIONER

## 2020-12-29 PROCEDURE — 25010000002 HEPARIN (PORCINE) 25000-0.45 UT/250ML-% SOLUTION: Performed by: NURSE PRACTITIONER

## 2020-12-29 PROCEDURE — 85520 HEPARIN ASSAY: CPT

## 2020-12-29 PROCEDURE — 82565 ASSAY OF CREATININE: CPT | Performed by: NURSE PRACTITIONER

## 2020-12-29 PROCEDURE — 83615 LACTATE (LD) (LDH) ENZYME: CPT | Performed by: NURSE PRACTITIONER

## 2020-12-29 PROCEDURE — 80076 HEPATIC FUNCTION PANEL: CPT | Performed by: NURSE PRACTITIONER

## 2020-12-29 PROCEDURE — 99233 SBSQ HOSP IP/OBS HIGH 50: CPT | Performed by: INTERNAL MEDICINE

## 2020-12-29 PROCEDURE — 86140 C-REACTIVE PROTEIN: CPT | Performed by: NURSE PRACTITIONER

## 2020-12-29 PROCEDURE — 85025 COMPLETE CBC W/AUTO DIFF WBC: CPT | Performed by: NURSE PRACTITIONER

## 2020-12-29 PROCEDURE — 63710000001 DEXAMETHASONE PER 0.25 MG: Performed by: INTERNAL MEDICINE

## 2020-12-29 PROCEDURE — 93306 TTE W/DOPPLER COMPLETE: CPT

## 2020-12-29 PROCEDURE — 93306 TTE W/DOPPLER COMPLETE: CPT | Performed by: INTERNAL MEDICINE

## 2020-12-29 PROCEDURE — 82728 ASSAY OF FERRITIN: CPT | Performed by: NURSE PRACTITIONER

## 2020-12-29 PROCEDURE — 85384 FIBRINOGEN ACTIVITY: CPT | Performed by: NURSE PRACTITIONER

## 2020-12-29 RX ORDER — HYDROXYZINE HYDROCHLORIDE 25 MG/1
25 TABLET, FILM COATED ORAL 3 TIMES DAILY PRN
Status: DISCONTINUED | OUTPATIENT
Start: 2020-12-29 | End: 2021-01-05 | Stop reason: HOSPADM

## 2020-12-29 RX ADMIN — DEXAMETHASONE 6 MG: 4 TABLET ORAL at 09:35

## 2020-12-29 RX ADMIN — SODIUM CHLORIDE, PRESERVATIVE FREE 10 ML: 5 INJECTION INTRAVENOUS at 19:39

## 2020-12-29 RX ADMIN — PANTOPRAZOLE SODIUM 40 MG: 40 TABLET, DELAYED RELEASE ORAL at 05:10

## 2020-12-29 RX ADMIN — HEPARIN SODIUM 18 UNITS/KG/HR: 10000 INJECTION, SOLUTION INTRAVENOUS at 12:23

## 2020-12-29 RX ADMIN — AMLODIPINE BESYLATE 10 MG: 10 TABLET ORAL at 09:35

## 2020-12-29 RX ADMIN — ASPIRIN 81 MG CHEWABLE TABLET 81 MG: 81 TABLET CHEWABLE at 09:35

## 2020-12-29 RX ADMIN — REMDESIVIR 100 MG: 100 INJECTION, POWDER, LYOPHILIZED, FOR SOLUTION INTRAVENOUS at 17:59

## 2020-12-29 RX ADMIN — HYDROXYZINE HYDROCHLORIDE 25 MG: 25 TABLET, FILM COATED ORAL at 12:24

## 2020-12-30 LAB
ALBUMIN SERPL-MCNC: 3 G/DL (ref 3.5–5.2)
ALP SERPL-CCNC: 88 U/L (ref 39–117)
ALT SERPL W P-5'-P-CCNC: 43 U/L (ref 1–33)
AST SERPL-CCNC: 20 U/L (ref 1–32)
BASOPHILS # BLD AUTO: 0.03 10*3/MM3 (ref 0–0.2)
BASOPHILS NFR BLD AUTO: 0.2 % (ref 0–1.5)
BILIRUB CONJ SERPL-MCNC: <0.2 MG/DL (ref 0–0.3)
BILIRUB INDIRECT SERPL-MCNC: ABNORMAL MG/DL
BILIRUB SERPL-MCNC: 0.2 MG/DL (ref 0–1.2)
CK SERPL-CCNC: 54 U/L (ref 20–180)
CREAT SERPL-MCNC: 0.55 MG/DL (ref 0.57–1)
CRP SERPL-MCNC: 3.9 MG/DL (ref 0–0.5)
DEPRECATED RDW RBC AUTO: 45.8 FL (ref 37–54)
EOSINOPHIL # BLD AUTO: 0 10*3/MM3 (ref 0–0.4)
EOSINOPHIL NFR BLD AUTO: 0 % (ref 0.3–6.2)
ERYTHROCYTE [DISTWIDTH] IN BLOOD BY AUTOMATED COUNT: 13.4 % (ref 12.3–15.4)
FERRITIN SERPL-MCNC: 281.2 NG/ML (ref 13–150)
GFR SERPL CREATININE-BSD FRML MDRD: 107 ML/MIN/1.73
HCT VFR BLD AUTO: 36.9 % (ref 34–46.6)
HGB BLD-MCNC: 11.6 G/DL (ref 12–15.9)
IMM GRANULOCYTES # BLD AUTO: 0.29 10*3/MM3 (ref 0–0.05)
IMM GRANULOCYTES NFR BLD AUTO: 2.4 % (ref 0–0.5)
INR PPP: 1.18 (ref 0.85–1.16)
LYMPHOCYTES # BLD AUTO: 1.13 10*3/MM3 (ref 0.7–3.1)
LYMPHOCYTES NFR BLD AUTO: 9.2 % (ref 19.6–45.3)
MCH RBC QN AUTO: 29 PG (ref 26.6–33)
MCHC RBC AUTO-ENTMCNC: 31.4 G/DL (ref 31.5–35.7)
MCV RBC AUTO: 92.3 FL (ref 79–97)
MONOCYTES # BLD AUTO: 0.89 10*3/MM3 (ref 0.1–0.9)
MONOCYTES NFR BLD AUTO: 7.3 % (ref 5–12)
NEUTROPHILS NFR BLD AUTO: 80.9 % (ref 42.7–76)
NEUTROPHILS NFR BLD AUTO: 9.91 10*3/MM3 (ref 1.7–7)
NRBC BLD AUTO-RTO: 0 /100 WBC (ref 0–0.2)
PLATELET # BLD AUTO: 420 10*3/MM3 (ref 140–450)
PMV BLD AUTO: 10.6 FL (ref 6–12)
PROT SERPL-MCNC: 6.4 G/DL (ref 6–8.5)
PROTHROMBIN TIME: 14.7 SECONDS (ref 11.5–14)
RBC # BLD AUTO: 4 10*6/MM3 (ref 3.77–5.28)
UFH PPP CHRO-ACNC: 0.46 IU/ML (ref 0.3–0.7)
WBC # BLD AUTO: 12.25 10*3/MM3 (ref 3.4–10.8)

## 2020-12-30 PROCEDURE — 82550 ASSAY OF CK (CPK): CPT | Performed by: NURSE PRACTITIONER

## 2020-12-30 PROCEDURE — 25010000002 HEPARIN (PORCINE) 25000-0.45 UT/250ML-% SOLUTION

## 2020-12-30 PROCEDURE — 82565 ASSAY OF CREATININE: CPT | Performed by: NURSE PRACTITIONER

## 2020-12-30 PROCEDURE — 85520 HEPARIN ASSAY: CPT

## 2020-12-30 PROCEDURE — 82728 ASSAY OF FERRITIN: CPT | Performed by: NURSE PRACTITIONER

## 2020-12-30 PROCEDURE — 85610 PROTHROMBIN TIME: CPT | Performed by: INTERNAL MEDICINE

## 2020-12-30 PROCEDURE — 99233 SBSQ HOSP IP/OBS HIGH 50: CPT | Performed by: INTERNAL MEDICINE

## 2020-12-30 PROCEDURE — 85025 COMPLETE CBC W/AUTO DIFF WBC: CPT | Performed by: NURSE PRACTITIONER

## 2020-12-30 PROCEDURE — 86140 C-REACTIVE PROTEIN: CPT | Performed by: NURSE PRACTITIONER

## 2020-12-30 PROCEDURE — 80076 HEPATIC FUNCTION PANEL: CPT | Performed by: NURSE PRACTITIONER

## 2020-12-30 PROCEDURE — 63710000001 DEXAMETHASONE PER 0.25 MG: Performed by: INTERNAL MEDICINE

## 2020-12-30 RX ORDER — RIVAROXABAN 15 MG-20MG
KIT ORAL
Qty: 51 EACH | Refills: 0 | Status: SHIPPED | OUTPATIENT
Start: 2020-12-30 | End: 2020-12-30 | Stop reason: HOSPADM

## 2020-12-30 RX ORDER — PRAVASTATIN SODIUM 40 MG
40 TABLET ORAL NIGHTLY
Status: DISCONTINUED | OUTPATIENT
Start: 2020-12-30 | End: 2021-01-05 | Stop reason: HOSPADM

## 2020-12-30 RX ORDER — WARFARIN SODIUM 5 MG/1
5 TABLET ORAL
Status: DISCONTINUED | OUTPATIENT
Start: 2020-12-30 | End: 2021-01-03

## 2020-12-30 RX ORDER — ALPRAZOLAM 0.25 MG/1
0.25 TABLET ORAL 3 TIMES DAILY PRN
Status: DISCONTINUED | OUTPATIENT
Start: 2020-12-30 | End: 2021-01-05 | Stop reason: HOSPADM

## 2020-12-30 RX ORDER — HEPARIN SODIUM 10000 [USP'U]/100ML
14 INJECTION, SOLUTION INTRAVENOUS
Status: DISCONTINUED | OUTPATIENT
Start: 2020-12-30 | End: 2021-01-03

## 2020-12-30 RX ADMIN — WARFARIN SODIUM 5 MG: 5 TABLET ORAL at 17:47

## 2020-12-30 RX ADMIN — DEXAMETHASONE 6 MG: 4 TABLET ORAL at 09:20

## 2020-12-30 RX ADMIN — ALPRAZOLAM 0.25 MG: 0.25 TABLET ORAL at 13:36

## 2020-12-30 RX ADMIN — REMDESIVIR 100 MG: 100 INJECTION, POWDER, LYOPHILIZED, FOR SOLUTION INTRAVENOUS at 13:31

## 2020-12-30 RX ADMIN — PRAVASTATIN SODIUM 40 MG: 40 TABLET ORAL at 19:44

## 2020-12-30 RX ADMIN — AMLODIPINE BESYLATE 10 MG: 10 TABLET ORAL at 09:20

## 2020-12-30 RX ADMIN — HEPARIN SODIUM 18 UNITS/KG/HR: 10000 INJECTION, SOLUTION INTRAVENOUS at 17:48

## 2020-12-30 RX ADMIN — APIXABAN 10 MG: 5 TABLET, FILM COATED ORAL at 09:20

## 2020-12-30 RX ADMIN — HYDROXYZINE HYDROCHLORIDE 25 MG: 25 TABLET, FILM COATED ORAL at 11:02

## 2020-12-30 RX ADMIN — SODIUM CHLORIDE, PRESERVATIVE FREE 10 ML: 5 INJECTION INTRAVENOUS at 19:45

## 2020-12-30 RX ADMIN — ALPRAZOLAM 0.25 MG: 0.25 TABLET ORAL at 22:00

## 2020-12-30 RX ADMIN — ASPIRIN 81 MG CHEWABLE TABLET 81 MG: 81 TABLET CHEWABLE at 09:20

## 2020-12-30 RX ADMIN — TRAMADOL HYDROCHLORIDE 50 MG: 50 TABLET, COATED ORAL at 11:02

## 2020-12-31 ENCOUNTER — TELEPHONE (OUTPATIENT)
Dept: PHARMACY | Facility: HOSPITAL | Age: 80
End: 2020-12-31

## 2020-12-31 LAB
ALBUMIN SERPL-MCNC: 3.4 G/DL (ref 3.5–5.2)
ALP SERPL-CCNC: 98 U/L (ref 39–117)
ALT SERPL W P-5'-P-CCNC: 41 U/L (ref 1–33)
APTT PPP: 57.7 SECONDS (ref 50–95)
APTT PPP: 74.8 SECONDS (ref 50–95)
AST SERPL-CCNC: 23 U/L (ref 1–32)
BASOPHILS # BLD AUTO: 0.06 10*3/MM3 (ref 0–0.2)
BASOPHILS NFR BLD AUTO: 0.5 % (ref 0–1.5)
BILIRUB CONJ SERPL-MCNC: <0.2 MG/DL (ref 0–0.3)
BILIRUB INDIRECT SERPL-MCNC: ABNORMAL MG/DL
BILIRUB SERPL-MCNC: 0.2 MG/DL (ref 0–1.2)
CK SERPL-CCNC: 33 U/L (ref 20–180)
CREAT SERPL-MCNC: 0.61 MG/DL (ref 0.57–1)
CRP SERPL-MCNC: 1.78 MG/DL (ref 0–0.5)
D DIMER PPP FEU-MCNC: 3 MCGFEU/ML (ref 0–0.56)
DEPRECATED RDW RBC AUTO: 46.9 FL (ref 37–54)
EOSINOPHIL # BLD AUTO: 0.01 10*3/MM3 (ref 0–0.4)
EOSINOPHIL NFR BLD AUTO: 0.1 % (ref 0.3–6.2)
ERYTHROCYTE [DISTWIDTH] IN BLOOD BY AUTOMATED COUNT: 13.4 % (ref 12.3–15.4)
FERRITIN SERPL-MCNC: 301.3 NG/ML (ref 13–150)
FIBRINOGEN PPP-MCNC: 478 MG/DL (ref 215–430)
GFR SERPL CREATININE-BSD FRML MDRD: 95 ML/MIN/1.73
HCT VFR BLD AUTO: 39.9 % (ref 34–46.6)
HGB BLD-MCNC: 12.8 G/DL (ref 12–15.9)
IMM GRANULOCYTES # BLD AUTO: 0.57 10*3/MM3 (ref 0–0.05)
IMM GRANULOCYTES NFR BLD AUTO: 4.5 % (ref 0–0.5)
INR PPP: 1.32 (ref 0.85–1.16)
LDH SERPL-CCNC: 323 U/L (ref 135–214)
LYMPHOCYTES # BLD AUTO: 1.55 10*3/MM3 (ref 0.7–3.1)
LYMPHOCYTES NFR BLD AUTO: 12.3 % (ref 19.6–45.3)
MCH RBC QN AUTO: 30.6 PG (ref 26.6–33)
MCHC RBC AUTO-ENTMCNC: 32.1 G/DL (ref 31.5–35.7)
MCV RBC AUTO: 95.5 FL (ref 79–97)
MONOCYTES # BLD AUTO: 0.57 10*3/MM3 (ref 0.1–0.9)
MONOCYTES NFR BLD AUTO: 4.5 % (ref 5–12)
NEUTROPHILS NFR BLD AUTO: 78.1 % (ref 42.7–76)
NEUTROPHILS NFR BLD AUTO: 9.82 10*3/MM3 (ref 1.7–7)
NRBC BLD AUTO-RTO: 0 /100 WBC (ref 0–0.2)
PLATELET # BLD AUTO: 450 10*3/MM3 (ref 140–450)
PMV BLD AUTO: 9.7 FL (ref 6–12)
PROT SERPL-MCNC: 7.1 G/DL (ref 6–8.5)
PROTHROMBIN TIME: 16 SECONDS (ref 11.5–14)
RBC # BLD AUTO: 4.18 10*6/MM3 (ref 3.77–5.28)
UFH PPP CHRO-ACNC: >1.1 IU/ML (ref 0.3–0.7)
WBC # BLD AUTO: 12.58 10*3/MM3 (ref 3.4–10.8)

## 2020-12-31 PROCEDURE — 85025 COMPLETE CBC W/AUTO DIFF WBC: CPT | Performed by: NURSE PRACTITIONER

## 2020-12-31 PROCEDURE — 82728 ASSAY OF FERRITIN: CPT | Performed by: NURSE PRACTITIONER

## 2020-12-31 PROCEDURE — 25010000002 HEPARIN (PORCINE) 25000-0.45 UT/250ML-% SOLUTION

## 2020-12-31 PROCEDURE — 82565 ASSAY OF CREATININE: CPT | Performed by: NURSE PRACTITIONER

## 2020-12-31 PROCEDURE — 85384 FIBRINOGEN ACTIVITY: CPT | Performed by: NURSE PRACTITIONER

## 2020-12-31 PROCEDURE — 85730 THROMBOPLASTIN TIME PARTIAL: CPT

## 2020-12-31 PROCEDURE — 83615 LACTATE (LD) (LDH) ENZYME: CPT | Performed by: NURSE PRACTITIONER

## 2020-12-31 PROCEDURE — 63710000001 DEXAMETHASONE PER 0.25 MG: Performed by: INTERNAL MEDICINE

## 2020-12-31 PROCEDURE — 82550 ASSAY OF CK (CPK): CPT | Performed by: NURSE PRACTITIONER

## 2020-12-31 PROCEDURE — 85379 FIBRIN DEGRADATION QUANT: CPT | Performed by: NURSE PRACTITIONER

## 2020-12-31 PROCEDURE — 85520 HEPARIN ASSAY: CPT

## 2020-12-31 PROCEDURE — 86140 C-REACTIVE PROTEIN: CPT | Performed by: NURSE PRACTITIONER

## 2020-12-31 PROCEDURE — 85610 PROTHROMBIN TIME: CPT | Performed by: INTERNAL MEDICINE

## 2020-12-31 PROCEDURE — 99233 SBSQ HOSP IP/OBS HIGH 50: CPT | Performed by: INTERNAL MEDICINE

## 2020-12-31 PROCEDURE — 80076 HEPATIC FUNCTION PANEL: CPT | Performed by: NURSE PRACTITIONER

## 2020-12-31 RX ADMIN — DEXAMETHASONE 6 MG: 4 TABLET ORAL at 08:29

## 2020-12-31 RX ADMIN — ASPIRIN 81 MG CHEWABLE TABLET 81 MG: 81 TABLET CHEWABLE at 08:30

## 2020-12-31 RX ADMIN — PRAVASTATIN SODIUM 40 MG: 40 TABLET ORAL at 20:15

## 2020-12-31 RX ADMIN — HEPARIN SODIUM 18 UNITS/KG/HR: 10000 INJECTION, SOLUTION INTRAVENOUS at 08:42

## 2020-12-31 RX ADMIN — REMDESIVIR 100 MG: 100 INJECTION, POWDER, LYOPHILIZED, FOR SOLUTION INTRAVENOUS at 12:25

## 2020-12-31 RX ADMIN — WARFARIN SODIUM 5 MG: 5 TABLET ORAL at 17:40

## 2020-12-31 RX ADMIN — SODIUM CHLORIDE, PRESERVATIVE FREE 10 ML: 5 INJECTION INTRAVENOUS at 20:15

## 2020-12-31 RX ADMIN — AMLODIPINE BESYLATE 10 MG: 10 TABLET ORAL at 08:29

## 2020-12-31 NOTE — TELEPHONE ENCOUNTER
Patient new start to warfarin for PE, will be discharged home with Jenna UNDERWOOD. Of note, patient currently has COVID. Possible discharge 1/3. Referral not yet entered.

## 2021-01-01 LAB
ALBUMIN SERPL-MCNC: 3.1 G/DL (ref 3.5–5.2)
ALP SERPL-CCNC: 83 U/L (ref 39–117)
ALT SERPL W P-5'-P-CCNC: 45 U/L (ref 1–33)
APTT PPP: 103.3 SECONDS (ref 50–95)
APTT PPP: 109.7 SECONDS (ref 50–95)
AST SERPL-CCNC: 36 U/L (ref 1–32)
BASOPHILS # BLD MANUAL: 0 10*3/MM3 (ref 0–0.2)
BASOPHILS NFR BLD AUTO: 0 % (ref 0–1.5)
BILIRUB CONJ SERPL-MCNC: <0.2 MG/DL (ref 0–0.3)
BILIRUB INDIRECT SERPL-MCNC: ABNORMAL MG/DL
BILIRUB SERPL-MCNC: 0.2 MG/DL (ref 0–1.2)
CK SERPL-CCNC: 25 U/L (ref 20–180)
CREAT SERPL-MCNC: 0.53 MG/DL (ref 0.57–1)
CRP SERPL-MCNC: 0.93 MG/DL (ref 0–0.5)
DEPRECATED RDW RBC AUTO: 44.9 FL (ref 37–54)
EOSINOPHIL # BLD MANUAL: 0 10*3/MM3 (ref 0–0.4)
EOSINOPHIL NFR BLD MANUAL: 0 % (ref 0.3–6.2)
ERYTHROCYTE [DISTWIDTH] IN BLOOD BY AUTOMATED COUNT: 13.2 % (ref 12.3–15.4)
FERRITIN SERPL-MCNC: 311.1 NG/ML (ref 13–150)
GFR SERPL CREATININE-BSD FRML MDRD: 111 ML/MIN/1.73
HCT VFR BLD AUTO: 37.4 % (ref 34–46.6)
HGB BLD-MCNC: 12.2 G/DL (ref 12–15.9)
INR PPP: 1.6 (ref 0.85–1.16)
LYMPHOCYTES # BLD MANUAL: 1.3 10*3/MM3 (ref 0.7–3.1)
LYMPHOCYTES NFR BLD MANUAL: 10 % (ref 19.6–45.3)
LYMPHOCYTES NFR BLD MANUAL: 8 % (ref 5–12)
MCH RBC QN AUTO: 30.6 PG (ref 26.6–33)
MCHC RBC AUTO-ENTMCNC: 32.6 G/DL (ref 31.5–35.7)
MCV RBC AUTO: 93.7 FL (ref 79–97)
METAMYELOCYTES NFR BLD MANUAL: 1 % (ref 0–0)
MONOCYTES # BLD AUTO: 1.04 10*3/MM3 (ref 0.1–0.9)
MYELOCYTES NFR BLD MANUAL: 2 % (ref 0–0)
NEUTROPHILS # BLD AUTO: 10.23 10*3/MM3 (ref 1.7–7)
NEUTROPHILS NFR BLD MANUAL: 76 % (ref 42.7–76)
NEUTS BAND NFR BLD MANUAL: 3 % (ref 0–5)
PLAT MORPH BLD: NORMAL
PLATELET # BLD AUTO: 439 10*3/MM3 (ref 140–450)
PMV BLD AUTO: 9.5 FL (ref 6–12)
PROT SERPL-MCNC: 6.3 G/DL (ref 6–8.5)
PROTHROMBIN TIME: 18.8 SECONDS (ref 11.5–14)
RBC # BLD AUTO: 3.99 10*6/MM3 (ref 3.77–5.28)
RBC MORPH BLD: NORMAL
UFH PPP CHRO-ACNC: 0.77 IU/ML (ref 0.3–0.7)
WBC # BLD AUTO: 12.95 10*3/MM3 (ref 3.4–10.8)
WBC MORPH BLD: NORMAL

## 2021-01-01 PROCEDURE — 85025 COMPLETE CBC W/AUTO DIFF WBC: CPT

## 2021-01-01 PROCEDURE — 82550 ASSAY OF CK (CPK): CPT | Performed by: NURSE PRACTITIONER

## 2021-01-01 PROCEDURE — 85520 HEPARIN ASSAY: CPT

## 2021-01-01 PROCEDURE — 80076 HEPATIC FUNCTION PANEL: CPT | Performed by: NURSE PRACTITIONER

## 2021-01-01 PROCEDURE — 85610 PROTHROMBIN TIME: CPT | Performed by: INTERNAL MEDICINE

## 2021-01-01 PROCEDURE — 82565 ASSAY OF CREATININE: CPT | Performed by: NURSE PRACTITIONER

## 2021-01-01 PROCEDURE — 63710000001 DEXAMETHASONE PER 0.25 MG: Performed by: INTERNAL MEDICINE

## 2021-01-01 PROCEDURE — 85007 BL SMEAR W/DIFF WBC COUNT: CPT

## 2021-01-01 PROCEDURE — 25010000002 HEPARIN (PORCINE) 25000-0.45 UT/250ML-% SOLUTION

## 2021-01-01 PROCEDURE — 82728 ASSAY OF FERRITIN: CPT | Performed by: NURSE PRACTITIONER

## 2021-01-01 PROCEDURE — 86140 C-REACTIVE PROTEIN: CPT | Performed by: NURSE PRACTITIONER

## 2021-01-01 PROCEDURE — 99233 SBSQ HOSP IP/OBS HIGH 50: CPT | Performed by: INTERNAL MEDICINE

## 2021-01-01 PROCEDURE — 85730 THROMBOPLASTIN TIME PARTIAL: CPT

## 2021-01-01 RX ADMIN — ASPIRIN 81 MG CHEWABLE TABLET 81 MG: 81 TABLET CHEWABLE at 09:01

## 2021-01-01 RX ADMIN — REMDESIVIR 100 MG: 100 INJECTION, POWDER, LYOPHILIZED, FOR SOLUTION INTRAVENOUS at 12:02

## 2021-01-01 RX ADMIN — ALPRAZOLAM 0.25 MG: 0.25 TABLET ORAL at 21:18

## 2021-01-01 RX ADMIN — PANTOPRAZOLE SODIUM 40 MG: 40 TABLET, DELAYED RELEASE ORAL at 09:01

## 2021-01-01 RX ADMIN — PRAVASTATIN SODIUM 40 MG: 40 TABLET ORAL at 21:18

## 2021-01-01 RX ADMIN — AMLODIPINE BESYLATE 10 MG: 10 TABLET ORAL at 09:01

## 2021-01-01 RX ADMIN — SODIUM CHLORIDE, PRESERVATIVE FREE 10 ML: 5 INJECTION INTRAVENOUS at 09:02

## 2021-01-01 RX ADMIN — SODIUM CHLORIDE, PRESERVATIVE FREE 10 ML: 5 INJECTION INTRAVENOUS at 21:18

## 2021-01-01 RX ADMIN — WARFARIN SODIUM 5 MG: 5 TABLET ORAL at 18:19

## 2021-01-01 RX ADMIN — ALPRAZOLAM 0.25 MG: 0.25 TABLET ORAL at 12:05

## 2021-01-01 RX ADMIN — HEPARIN SODIUM 18 UNITS/KG/HR: 10000 INJECTION, SOLUTION INTRAVENOUS at 03:24

## 2021-01-01 RX ADMIN — DEXAMETHASONE 6 MG: 4 TABLET ORAL at 09:01

## 2021-01-01 NOTE — PROGRESS NOTES
[facility] Medicine Services  PROGRESS NOTE    Patient Name: Mine Curry  : 1940  MRN: [id number]    Date of Admission: 2020  Primary Care Physician: Eleni Hunter MD    Subjective   Subjective     CC:  covid 19    HPI:  Doing fine. Multiple questions about her medications.  Breathing stable. Anxious       ROS:  Gen- No fevers, chills  CV- No chest pain, palpitations  Resp- No cough, -dyspnea  GI- No N/V/D, abd pain        Objective   Objective     Vital Signs:   Temp:  [96.5 °F (35.8 °C)-98.6 °F (37 °C)] 96.8 °F (36 °C)  Heart Rate:  [53-72] 71  Resp:  [16-18] 18  BP: (147-164)/() 161/78        Physical Exam:  GEN- no acute distress noted, resting in bed, awake, anxious  HEENT- atraumatic, normocephalic, eomi  NECK- supple, trachea midline, no masses  RESP: ctab, normal effort, on RA   CV: no murmurs, s1/s2, rrr  MSK: no edema noted, spontaneous movement of all extremities  NEURO: alert, oriented, no focal deficits  SKIN: no rashes  PSYCH: appropriate mood and affect but very anxious      Results Reviewed:  Results from last 7 days   Lab Units 21  0841 21  0034 20  0647 20  0536  20  2229  20  0925   WBC 10*3/mm3  --  12.95* 12.58* 12.25*   < >  --    < > 8.20   HEMOGLOBIN g/dL  --  12.2 12.8 11.6*   < >  --    < > 12.7   HEMATOCRIT %  --  37.4 39.9 36.9   < >  --    < > 39.1   PLATELETS 10*3/mm3  --  439 450 420   < >  --    < > 326   INR  1.60*  --  1.32* 1.18*  --   --    < >  --    PROCALCITONIN ng/mL  --   --   --   --   --  0.04  --  0.05    < > = values in this interval not displayed.     Results from last 7 days   Lab Units 21  0841 20  0647 20  0536  20  0925   SODIUM mmol/L  --   --   --   --  145   POTASSIUM mmol/L  --   --   --   --  3.5   CHLORIDE mmol/L  --   --   --   --  108*   CO2 mmol/L  --   --   --   --  24.0   BUN mg/dL  --   --   --   --  10   CREATININE mg/dL 0.53* 0.61 0.55*    < > 0.54*   GLUCOSE mg/dL  --   --   --   --  115*   CALCIUM mg/dL  --   --   --   --  9.3   ALT (SGPT) U/L 45* 41* 43*   < > 80*   AST (SGOT) U/L 36* 23 20   < > 51*   TROPONIN T ng/mL  --   --   --   --  <0.010   PROBNP pg/mL  --   --   --   --  182.9    < > = values in this interval not displayed.     Estimated Creatinine Clearance: 56.6 mL/min (A) (by C-G formula based on SCr of 0.53 mg/dL (L)).    Microbiology Results Abnormal     Procedure Component Value - Date/Time    Blood Culture - Blood, Arm, Left [[id number]] Collected: 12/28/20 1050    Lab Status: Preliminary result Specimen: Blood from Arm, Left Updated: 12/31/20 1345     Blood Culture No growth at 3 days    Blood Culture - Blood, Arm, Left [[id number]] Collected: 12/28/20 1055    Lab Status: Preliminary result Specimen: Blood from Arm, Left Updated: 12/31/20 1345     Blood Culture No growth at 3 days    COVID PRE-OP / PRE-PROCEDURE SCREENING ORDER (NO ISOLATION) - Swab, Nasopharynx [[id number]]  (Abnormal) Collected: 12/28/20 1136    Lab Status: Final result Specimen: Swab from Nasopharynx Updated: 12/28/20 9395    Narrative:      The following orders were created for panel order COVID PRE-OP / PRE-PROCEDURE SCREENING ORDER (NO ISOLATION) - Swab, Nasopharynx.  Procedure                               Abnormality         Status                     ---------                               -----------         ------                     Respiratory Panel PCR w/...[[id number]]  Abnormal            Final result                 Please view results for these tests on the individual orders.    Respiratory Panel PCR w/COVID-19(SARS-CoV-2) VALERIA/ALEX/EVERT/PAD/COR/MAD/JEFF In-House, NP Swab in [facility], 3-4 HR TAT - Swab, Nasopharynx [[id number]]  (Abnormal) Collected: 12/28/20 1136    Lab Status: Final result Specimen: Swab from Nasopharynx Updated: 12/28/20 0935     ADENOVIRUS, PCR Not Detected     Coronavirus 229E Not Detected     Coronavirus HKU1 Not Detected      Coronavirus NL63 Not Detected     Coronavirus OC43 Not Detected     COVID19 Detected     Human Metapneumovirus Not Detected     Human Rhinovirus/Enterovirus Not Detected     Influenza A PCR Not Detected     Influenza A H1 Not Detected     Influenza A H1 2009 PCR Not Detected     Influenza A H3 Not Detected     Influenza B PCR Not Detected     Parainfluenza Virus 1 Not Detected     Parainfluenza Virus 2 Not Detected     Parainfluenza Virus 3 Not Detected     Parainfluenza Virus 4 Not Detected     RSV, PCR Not Detected     Bordetella pertussis pcr Not Detected     Bordetella parapertussis PCR Not Detected     Chlamydophila pneumoniae PCR Not Detected     Mycoplasma pneumo by PCR Not Detected    Narrative:      Fact sheet for providers: https://docs.[employer]/wp-content/uploads/WHA8392-8333-YG2.1-EUA-Provider-Fact-Sheet-3.pdf    Fact sheet for patients: https://docs.[employer]/wp-content/uploads/SDH2861-8182-GO2.1-EUA-Patient-Fact-Sheet-1.pdf    Test performed by PCR.          Imaging Results (Last 24 Hours)     ** No results found for the last 24 hours. **          Results for orders placed during the hospital encounter of 12/28/20   Adult Transthoracic Echo Complete W/ Cont if Necessary Per Protocol    Narrative · Left ventricular ejection fraction appears to be 66 - 70%.  · Sigmoid-shaped ventricular septum is present  · Normal right ventricular cavity size, wall thickness, systolic function   and septal motion noted.  · Mild tricuspid valve regurgitation is present. Estimated right   ventricular systolic pressure from tricuspid regurgitation is moderately   elevated (45-55 mmHg).  · There is no evidence of pericardial effusion          I have reviewed the medications:  Scheduled Meds:amLODIPine, 10 mg, Oral, Daily  aspirin, 81 mg, Oral, Daily  dexamethasone, 6 mg, Oral, Daily With Breakfast  pantoprazole, 40 mg, Oral, QAM  pravastatin, 40 mg, Oral, Nightly  remdesivir, 100 mg, Intravenous, Q24H  sodium  chloride, 10 mL, Intravenous, Q12H  warfarin, 5 mg, Oral, Daily      Continuous Infusions:heparin, 16 Units/kg/hr, Last Rate: 16 Units/kg/hr (01/01/21 1015)  Pharmacy Consult - Remdesivir,   Pharmacy to Dose Heparin,   Pharmacy to dose warfarin,       PRN Meds:.•  acetaminophen  •  albuterol sulfate HFA  •  ALPRAZolam  •  baclofen  •  dextromethorphan polistirex ER  •  hydrOXYzine  •  Pharmacy Consult - Remdesivir  •  Pharmacy to Dose Heparin  •  Pharmacy to dose warfarin  •  prochlorperazine **OR** prochlorperazine **OR** prochlorperazine  •  sodium chloride  •  sodium chloride  •  traMADol    Assessment/Plan   Assessment & Plan     Active Hospital Problems    Diagnosis  POA   • Pneumonia due to COVID-19 virus [U07.1, J12.82]  Yes   • Acute pulmonary embolism (CMS/HCC) [I26.99]  Yes   • Hypertension [I10]  Yes      Resolved Hospital Problems   No resolved problems to display.        Brief Hospital Course to date:  Mine Curry is a 80 y.o. female with history of HTN, HLD, back pain who presented to the ED with cough and SOB for the last 10d with associated low grade fevers, and loss of taste/smell, mild diarrhea.  Diagnosed with COVID and right side PE with no evidence of heart strain.     This patient's problems and plans were partially entered by my partner and updated as appropriate by me 01/01/21.         COVID-19  Hypoxia  - Sats <94% on room air and occ on 2L-- has been weaned to RA this morning   - Dexamethasone (12/28) day 4/10   - Remdesivir (12/28) day 4/5  - Monitor disease progression labs  - She was on antibiotics in the ED -- normal procal, normal WBC -- will hold off on additional antibiotics   - PRN albuterol; cough meds  - DVT PPX - heparin drip -- initially considered NOAC option and d/w patient, daughter and pharmacy, however eliquis will be >250/month and xarelto >450/month, patient cannot afford this. Continue heparin with transition to coumadin. Long d/w patient today about options--  "Offered her option to switch to therapeutic lovenox for bridging as her renal function is normal, however she could not get over the idea of \"a shot\" and this would just \"make her too sick\". Did not feel comfortable with idea of injecting herself at home and felt her daughter could not help her. Will continue heparin for bridge and have told patient she may not be therapeutic until this weekend. Have d/w pharmacy and also anticoagulation clinic in regards to getting her in for INR checks next week.       Right side PE  --heparin gtt initiated in ED-- will continue for now and initiate coumadin with pharmacy to dose (NOAC options are not affordable)  --ECHO reviewed and normal EF-- anticoagulation discussion as above     Transaminitis   -Monitor while on remdesivir - improving- resume statin     HTN  --continue norvasc     HLD   - resumed statin, monitor LFTs      DVT prophylaxis:  Heparin gtt/coumadin      Updated patient's daughter Syl via phone 1252pm      Disposition: I expect the patient to be discharged pending completion of remdesivir and bridging/anticoagulation plan as above. Will need INR >2 and will ideally need 5 day bridge.    CODE STATUS:   Code Status and Medical Interventions:   Ordered at: 12/28/20 2988     Limited Support to NOT Include:    Intubation     Level Of Support Discussed With:    Patient     Code Status:    No CPR     Medical Interventions (Level of Support Prior to Arrest):    Limited       Radha Caro MD  01/01/21              "

## 2021-01-01 NOTE — PLAN OF CARE
Goal Outcome Evaluation:  Plan of Care Reviewed With: patient  Progress: improving  Outcome Summary: VSS, on RA, still on heparin gtt being managed by the pharmacist.  no complaints of pain this shift.

## 2021-01-01 NOTE — PLAN OF CARE
Goal Outcome Evaluation:  Plan of Care Reviewed With: patient  Progress: improving  Outcome Summary: VSS. room air. no soa reported. heparin gtt at 16units/kg/hr. No s/s of bleed. no complaints of pain or discomfort. pt frequently seeks out staff and states that she has high anxiety. prn anxiety meds given. remdesivir given today with no complications. will cont to monitor.

## 2021-01-01 NOTE — PROGRESS NOTES
"Pharmacy Consult  -  Warfarin    Mine Curry is a  80 y.o. female   Height - 162.6 cm (64\")  Weight - 75.3 kg (166 lb)    Consulting Provider: - Radha Caro MD  Indication: - PE  Goal INR: - 2-3  Home Regimen:   - new start    Bridge Therapy: Yes   and unfractionated heparin    Drug-Drug Interactions with current regimen:   Aspirin - may increase bleeding risk              Dexamethasone - may increase or decrease warfarin effect              Pantoprazole - may increase INR    Warfarin Dosing During Admission:    Date  12/30 12/31 1/1         INR  1.18 1.3 1.6         Dose  5mg 5mg (5mg)              Education Provided: Warfarin education provided on 12/31 verbally and in writing.  Discussed effects of warfarin, importance of checking INR, drug-drug and drug-food interactions, and signs/symptoms of bleeding and clotting.  Patient verbalized understanding through teach back.  All pertinent questions were answered.        Discharge Follow up:   Following Provider -  [facility]   Follow up time range or appointment - 2-3 days following discharge      Labs:    Results from last 7 days   Lab Units 01/01/21  0841 01/01/21  0034 12/31/20  0647 12/31/20  0013 12/30/20  0536 12/29/20  1110 12/28/20  1646 12/28/20  0925   INR  1.60*  --  1.32*  --  1.18*  --  1.18*  --    APTT seconds 109.7*  --  74.8 57.7  --   --  30.5*  --    HEMOGLOBIN g/dL  --  12.2 12.8  --  11.6* 12.3 12.2 12.7   HEMATOCRIT %  --  37.4 39.9  --  36.9 38.3 38.3 39.1     Results from last 7 days   Lab Units 12/31/20  0647 12/30/20  0536 12/29/20  1110  12/28/20  0925   SODIUM mmol/L  --   --   --   --  145   POTASSIUM mmol/L  --   --   --   --  3.5   CHLORIDE mmol/L  --   --   --   --  108*   CO2 mmol/L  --   --   --   --  24.0   BUN mg/dL  --   --   --   --  10   CREATININE mg/dL 0.61 0.55* 0.56*   < > 0.54*   CALCIUM mg/dL  --   --   --   --  9.3   BILIRUBIN mg/dL 0.2 0.2 0.3   < > 0.5   ALK PHOS U/L 98 88 104   < > 105   ALT " (SGPT) U/L 41* 43* 59*   < > 80*   AST (SGOT) U/L 23 20 34*   < > 51*   GLUCOSE mg/dL  --   --   --   --  115*    < > = values in this interval not displayed.       Current dietary intake: 12/31 75% breakfast, 100% lunch  Diet Order   Procedures    Diet Regular       Assessment/Plan:   Pharmacy to dose Warfarin secondary to PE. Goal INR: 2-3  INR trending towards goal after two doses of 5mg. INR 1.6 today. Will continue warfarin 5mg daily. Continue heparin drip until INR>2 x24hrs, min 5 days  Monitor s/s bleeding, clinical status, dietary intake and drug-drug interactions  Follow daily INR and adjust dose appropriately    Atn Carcamo, PharmD, BCPS  1/1/2021  10:05 EST

## 2021-01-02 LAB
ALBUMIN SERPL-MCNC: 2.9 G/DL (ref 3.5–5.2)
ALP SERPL-CCNC: 80 U/L (ref 39–117)
ALT SERPL W P-5'-P-CCNC: 40 U/L (ref 1–33)
AST SERPL-CCNC: 26 U/L (ref 1–32)
BACTERIA SPEC AEROBE CULT: NORMAL
BACTERIA SPEC AEROBE CULT: NORMAL
BASOPHILS # BLD AUTO: 0.13 10*3/MM3 (ref 0–0.2)
BASOPHILS NFR BLD AUTO: 0.8 % (ref 0–1.5)
BILIRUB CONJ SERPL-MCNC: <0.2 MG/DL (ref 0–0.3)
BILIRUB INDIRECT SERPL-MCNC: ABNORMAL MG/DL
BILIRUB SERPL-MCNC: 0.2 MG/DL (ref 0–1.2)
CK SERPL-CCNC: 22 U/L (ref 20–180)
CREAT SERPL-MCNC: 0.53 MG/DL (ref 0.57–1)
CRP SERPL-MCNC: 0.63 MG/DL (ref 0–0.5)
D DIMER PPP FEU-MCNC: 2.32 MCGFEU/ML (ref 0–0.56)
DEPRECATED RDW RBC AUTO: 45 FL (ref 37–54)
EOSINOPHIL # BLD AUTO: 0.02 10*3/MM3 (ref 0–0.4)
EOSINOPHIL NFR BLD AUTO: 0.1 % (ref 0.3–6.2)
ERYTHROCYTE [DISTWIDTH] IN BLOOD BY AUTOMATED COUNT: 13.2 % (ref 12.3–15.4)
FERRITIN SERPL-MCNC: 256.7 NG/ML (ref 13–150)
FIBRINOGEN PPP-MCNC: 402 MG/DL (ref 215–430)
GFR SERPL CREATININE-BSD FRML MDRD: 111 ML/MIN/1.73
GLUCOSE BLDC GLUCOMTR-MCNC: 82 MG/DL (ref 70–130)
HCT VFR BLD AUTO: 40.8 % (ref 34–46.6)
HGB BLD-MCNC: 13.2 G/DL (ref 12–15.9)
IMM GRANULOCYTES # BLD AUTO: 1.14 10*3/MM3 (ref 0–0.05)
IMM GRANULOCYTES NFR BLD AUTO: 7.4 % (ref 0–0.5)
INR PPP: 1.9 (ref 0.85–1.16)
LDH SERPL-CCNC: 402 U/L (ref 135–214)
LYMPHOCYTES # BLD AUTO: 1.85 10*3/MM3 (ref 0.7–3.1)
LYMPHOCYTES NFR BLD AUTO: 12 % (ref 19.6–45.3)
MCH RBC QN AUTO: 30.3 PG (ref 26.6–33)
MCHC RBC AUTO-ENTMCNC: 32.4 G/DL (ref 31.5–35.7)
MCV RBC AUTO: 93.6 FL (ref 79–97)
MONOCYTES # BLD AUTO: 1.14 10*3/MM3 (ref 0.1–0.9)
MONOCYTES NFR BLD AUTO: 7.4 % (ref 5–12)
NEUTROPHILS NFR BLD AUTO: 11.09 10*3/MM3 (ref 1.7–7)
NEUTROPHILS NFR BLD AUTO: 72.3 % (ref 42.7–76)
NRBC BLD AUTO-RTO: 0.1 /100 WBC (ref 0–0.2)
PLAT MORPH BLD: NORMAL
PLATELET # BLD AUTO: 478 10*3/MM3 (ref 140–450)
PMV BLD AUTO: 9.6 FL (ref 6–12)
PROT SERPL-MCNC: 6.1 G/DL (ref 6–8.5)
PROTHROMBIN TIME: 21.5 SECONDS (ref 11.5–14)
RBC # BLD AUTO: 4.36 10*6/MM3 (ref 3.77–5.28)
RBC MORPH BLD: NORMAL
UFH PPP CHRO-ACNC: 0.42 IU/ML (ref 0.3–0.7)
UFH PPP CHRO-ACNC: 0.63 IU/ML (ref 0.3–0.7)
WBC # BLD AUTO: 15.37 10*3/MM3 (ref 3.4–10.8)
WBC MORPH BLD: NORMAL

## 2021-01-02 PROCEDURE — 82550 ASSAY OF CK (CPK): CPT | Performed by: NURSE PRACTITIONER

## 2021-01-02 PROCEDURE — 63710000001 DEXAMETHASONE PER 0.25 MG: Performed by: INTERNAL MEDICINE

## 2021-01-02 PROCEDURE — 85384 FIBRINOGEN ACTIVITY: CPT | Performed by: NURSE PRACTITIONER

## 2021-01-02 PROCEDURE — 85520 HEPARIN ASSAY: CPT

## 2021-01-02 PROCEDURE — 82962 GLUCOSE BLOOD TEST: CPT

## 2021-01-02 PROCEDURE — 82565 ASSAY OF CREATININE: CPT | Performed by: NURSE PRACTITIONER

## 2021-01-02 PROCEDURE — 83615 LACTATE (LD) (LDH) ENZYME: CPT | Performed by: NURSE PRACTITIONER

## 2021-01-02 PROCEDURE — 25010000002 HEPARIN (PORCINE) 25000-0.45 UT/250ML-% SOLUTION

## 2021-01-02 PROCEDURE — 85610 PROTHROMBIN TIME: CPT | Performed by: INTERNAL MEDICINE

## 2021-01-02 PROCEDURE — 86140 C-REACTIVE PROTEIN: CPT | Performed by: NURSE PRACTITIONER

## 2021-01-02 PROCEDURE — 85379 FIBRIN DEGRADATION QUANT: CPT | Performed by: NURSE PRACTITIONER

## 2021-01-02 PROCEDURE — 85007 BL SMEAR W/DIFF WBC COUNT: CPT | Performed by: NURSE PRACTITIONER

## 2021-01-02 PROCEDURE — 99233 SBSQ HOSP IP/OBS HIGH 50: CPT | Performed by: INTERNAL MEDICINE

## 2021-01-02 PROCEDURE — 80076 HEPATIC FUNCTION PANEL: CPT | Performed by: NURSE PRACTITIONER

## 2021-01-02 PROCEDURE — 85025 COMPLETE CBC W/AUTO DIFF WBC: CPT | Performed by: NURSE PRACTITIONER

## 2021-01-02 PROCEDURE — 82728 ASSAY OF FERRITIN: CPT | Performed by: NURSE PRACTITIONER

## 2021-01-02 RX ORDER — LISINOPRIL 10 MG/1
10 TABLET ORAL
Status: DISCONTINUED | OUTPATIENT
Start: 2021-01-02 | End: 2021-01-05 | Stop reason: HOSPADM

## 2021-01-02 RX ADMIN — ALPRAZOLAM 0.25 MG: 0.25 TABLET ORAL at 21:29

## 2021-01-02 RX ADMIN — PANTOPRAZOLE SODIUM 40 MG: 40 TABLET, DELAYED RELEASE ORAL at 06:15

## 2021-01-02 RX ADMIN — PRAVASTATIN SODIUM 40 MG: 40 TABLET ORAL at 21:29

## 2021-01-02 RX ADMIN — AMLODIPINE BESYLATE 10 MG: 10 TABLET ORAL at 08:06

## 2021-01-02 RX ADMIN — TRAMADOL HYDROCHLORIDE 50 MG: 50 TABLET, COATED ORAL at 22:58

## 2021-01-02 RX ADMIN — DEXAMETHASONE 6 MG: 4 TABLET ORAL at 08:07

## 2021-01-02 RX ADMIN — WARFARIN SODIUM 5 MG: 5 TABLET ORAL at 17:31

## 2021-01-02 RX ADMIN — HEPARIN SODIUM 14 UNITS/KG/HR: 10000 INJECTION, SOLUTION INTRAVENOUS at 00:04

## 2021-01-02 RX ADMIN — SODIUM CHLORIDE, PRESERVATIVE FREE 10 ML: 5 INJECTION INTRAVENOUS at 21:29

## 2021-01-02 RX ADMIN — TRAMADOL HYDROCHLORIDE 50 MG: 50 TABLET, COATED ORAL at 17:40

## 2021-01-02 RX ADMIN — ALPRAZOLAM 0.25 MG: 0.25 TABLET ORAL at 08:07

## 2021-01-02 RX ADMIN — ASPIRIN 81 MG CHEWABLE TABLET 81 MG: 81 TABLET CHEWABLE at 08:06

## 2021-01-02 RX ADMIN — SODIUM CHLORIDE, PRESERVATIVE FREE 10 ML: 5 INJECTION INTRAVENOUS at 08:08

## 2021-01-02 RX ADMIN — LISINOPRIL 10 MG: 10 TABLET ORAL at 09:23

## 2021-01-02 NOTE — PROGRESS NOTES
HEPARIN INFUSION  Mine Curry is a  80 y.o. female receiving heparin infusion.     Therapy for (VTE/Cardiac): VTE  Patient Weight: 75.3 kg  Initial Bolus (Y/N): Y  Any Bolus (Y/N): Y    Signs or Symptoms of Bleeding: none.     VTE (PE/DVT)   Initial Bolus: 80 units/kg (Max 10,000 units)  Initial rate: 18 units/kg/hr (Max 1,500 units/hr)      (Anti-Xa) (IU/mL) Bolus Dose Stop Infusion Rate Change Repeat (Anti-Xa)     ? 0.19 80 units/kg 0 hrs Increase rate by   4 units/kg/hr 6 hrs      0.2 - 0.29 40 units/kg 0 hrs Increase rate by 2 units/kg/hr 6 hrs    0.3 - 0.7  0 0 hrs No change 6 hrs      0.71 - 0.99   0  0 hrs Decrease rate by 2 units/kg/hr 6 hrs      ? 1 0 Hold 1 hr Decrease rate by 3 units/kg/hr 6 hrs          Results from last 7 days   Lab Units 01/02/21  0338 01/01/21  0841 01/01/21  0034 12/31/20  0647   INR  1.90* 1.60*  --  1.32*   HEMOGLOBIN g/dL 13.2  --  12.2 12.8   HEMATOCRIT % 40.8  --  37.4 39.9   PLATELETS 10*3/mm3 478*  --  439 450          Date   Time   Anti-Xa/ aPTT  Current Rate (Unit/kg/hr) Bolus   (Units) Rate Change   (Unit/kg/hr) New Rate (Unit/kg/hr) Next   Anti-Xa Comments  Pump Check Daily   12.28 1700   6020  18 2300 S/w petra in ED   12/29 0015 0.50 18 -- -- 18 0600 D/w RN   12/29 1110 0.42 18 -- -- 18 0600 Theresa 3244, verified   12/30 0536 0.46 18 -- -- 18  D/C'ed 12/30am, apixaban 10mg x1 given, apixaban d/c'ed to restart heparin   12/30 Restart @1800  0 -- +18 18 0000 Theresa 3243   12/31 0100       Changed to aPTT, antiXa level >1.1 d/t eliquis this morning    12/31 0330 57.7 18 -- -- 18 0800 D/w ANDREI   12/31 0647 74.8 18 -- -- 18 0600 Franco Cruz, [facility]   1/1 0841 109.7 18  -2 16 1700 D/W RN    1/1 1842 103.3  0.77 16 -- -2 14 0200 D/W ANDREI [place]    1/2 0338 0.42 14 -- -- 14 1000 Melvi [address] [place] [address]   1/2 1003 0.63 14 -- -- 14 0600 Ricky 3242                                                                                                      Baltazar Calderon [facility]  1/2/2021    10:46 EST

## 2021-01-02 NOTE — PROGRESS NOTES
[facility] Medicine Services  PROGRESS NOTE    Patient Name: Mine Curry  : 1940  MRN: [id number]    Date of Admission: 2020  Primary Care Physician: Eleni Hunter MD    Subjective   Subjective     CC:  covid 19    HPI:  Feels okay, breathing without dyspnea, says she can walk across the room but sometimes needs a steadying hand.  Anxious about idea of being at home alone; dtr lives 4 miles away.       ROS:  Gen- No fevers, chills  CV- No chest pain, palpitations  Resp- No cough or dyspnea at rest; generalized weakness   GI- No N/V/D, abd pain        Objective   Objective     Vital Signs:   Temp:  [96.8 °F (36 °C)-98.2 °F (36.8 °C)] 98.2 °F (36.8 °C)  Heart Rate:  [44-71] 57  Resp:  [16-18] 18  BP: (155-168)/(75-95) 163/92        Physical Exam:  GEN- no acute distress noted, resting in bed, awake, on RA, sats 95 while talking to me   HEENT- atraumatic, normocephalic, eomi  NECK- supple, trachea midline,  RESP: ctab but diminished in both bases, normal effort, on RA   CV: no murmurs, s1/s2, rrr  MSK: no edema noted, spontaneous movement of all extremities  NEURO: alert, oriented, no focal deficits  SKIN: no rashes, warm and dry   PSYCH: appropriate mood and affect; asks approp questions       Results Reviewed:  Results from last 7 days   Lab Units 21  0338 21  0841 21  0034 20  0647  20  2229  20  0925   WBC 10*3/mm3 15.37*  --  12.95* 12.58*   < >  --    < > 8.20   HEMOGLOBIN g/dL 13.2  --  12.2 12.8   < >  --    < > 12.7   HEMATOCRIT % 40.8  --  37.4 39.9   < >  --    < > 39.1   PLATELETS 10*3/mm3 478*  --  439 450   < >  --    < > 326   INR  1.90* 1.60*  --  1.32*   < >  --    < >  --    PROCALCITONIN ng/mL  --   --   --   --   --  0.04  --  0.05    < > = values in this interval not displayed.     Results from last 7 days   Lab Units 21  0338 21  0841 20  0647  20  0925   SODIUM mmol/L  --   --   --    --  145   POTASSIUM mmol/L  --   --   --   --  3.5   CHLORIDE mmol/L  --   --   --   --  108*   CO2 mmol/L  --   --   --   --  24.0   BUN mg/dL  --   --   --   --  10   CREATININE mg/dL 0.53* 0.53* 0.61   < > 0.54*   GLUCOSE mg/dL  --   --   --   --  115*   CALCIUM mg/dL  --   --   --   --  9.3   ALT (SGPT) U/L 40* 45* 41*   < > 80*   AST (SGOT) U/L 26 36* 23   < > 51*   TROPONIN T ng/mL  --   --   --   --  <0.010   PROBNP pg/mL  --   --   --   --  182.9    < > = values in this interval not displayed.     Estimated Creatinine Clearance: 56.6 mL/min (A) (by C-G formula based on SCr of 0.53 mg/dL (L)).    Microbiology Results Abnormal     Procedure Component Value - Date/Time    Blood Culture - Blood, Arm, Left [[id number]] Collected: 12/28/20 1050    Lab Status: Preliminary result Specimen: Blood from Arm, Left Updated: 01/01/21 1345     Blood Culture No growth at 4 days    Blood Culture - Blood, Arm, Left [[id number]] Collected: 12/28/20 1055    Lab Status: Preliminary result Specimen: Blood from Arm, Left Updated: 01/01/21 1345     Blood Culture No growth at 4 days    COVID PRE-OP / PRE-PROCEDURE SCREENING ORDER (NO ISOLATION) - Swab, Nasopharynx [[id number]]  (Abnormal) Collected: 12/28/20 1136    Lab Status: Final result Specimen: Swab from Nasopharynx Updated: 12/28/20 1335    Narrative:      The following orders were created for panel order COVID PRE-OP / PRE-PROCEDURE SCREENING ORDER (NO ISOLATION) - Swab, Nasopharynx.  Procedure                               Abnormality         Status                     ---------                               -----------         ------                     Respiratory Panel PCR w/...[[id number]]  Abnormal            Final result                 Please view results for these tests on the individual orders.    Respiratory Panel PCR w/COVID-19(SARS-CoV-2) VALERIA/ALEX/EVERT/PAD/COR/MAD/JEFF In-House, NP Swab in UTM/VTM, 3-4 HR TAT - Swab, Nasopharynx [[id number]]  (Abnormal) Collected:  12/28/20 1136    Lab Status: Final result Specimen: Swab from Nasopharynx Updated: 12/28/20 1335     ADENOVIRUS, PCR Not Detected     Coronavirus 229E Not Detected     Coronavirus HKU1 Not Detected     Coronavirus NL63 Not Detected     Coronavirus OC43 Not Detected     COVID19 Detected     Human Metapneumovirus Not Detected     Human Rhinovirus/Enterovirus Not Detected     Influenza A PCR Not Detected     Influenza A H1 Not Detected     Influenza A H1 2009 PCR Not Detected     Influenza A H3 Not Detected     Influenza B PCR Not Detected     Parainfluenza Virus 1 Not Detected     Parainfluenza Virus 2 Not Detected     Parainfluenza Virus 3 Not Detected     Parainfluenza Virus 4 Not Detected     RSV, PCR Not Detected     Bordetella pertussis pcr Not Detected     Bordetella parapertussis PCR Not Detected     Chlamydophila pneumoniae PCR Not Detected     Mycoplasma pneumo by PCR Not Detected    Narrative:      Fact sheet for providers: https://docs.[employer]/wp-content/uploads/XJN4461-3818-GW1.1-EUA-Provider-Fact-Sheet-3.pdf    Fact sheet for patients: https://docs.[employer]/wp-content/uploads/PFQ9217-8065-LG2.1-EUA-Patient-Fact-Sheet-1.pdf    Test performed by PCR.          Imaging Results (Last 24 Hours)     ** No results found for the last 24 hours. **          Results for orders placed during the hospital encounter of 12/28/20   Adult Transthoracic Echo Complete W/ Cont if Necessary Per Protocol    Narrative · Left ventricular ejection fraction appears to be 66 - 70%.  · Sigmoid-shaped ventricular septum is present  · Normal right ventricular cavity size, wall thickness, systolic function   and septal motion noted.  · Mild tricuspid valve regurgitation is present. Estimated right   ventricular systolic pressure from tricuspid regurgitation is moderately   elevated (45-55 mmHg).  · There is no evidence of pericardial effusion          I have reviewed the medications:  Scheduled Meds:amLODIPine, 10 mg, Oral,  Daily  aspirin, 81 mg, Oral, Daily  dexamethasone, 6 mg, Oral, Daily With Breakfast  pantoprazole, 40 mg, Oral, QAM  pravastatin, 40 mg, Oral, Nightly  sodium chloride, 10 mL, Intravenous, Q12H  warfarin, 5 mg, Oral, Daily      Continuous Infusions:heparin, 14 Units/kg/hr, Last Rate: 14 Units/kg/hr (01/02/21 0004)  Pharmacy Consult - Remdesivir,   Pharmacy to Dose Heparin,   Pharmacy to dose warfarin,       PRN Meds:.•  acetaminophen  •  albuterol sulfate HFA  •  ALPRAZolam  •  baclofen  •  dextromethorphan polistirex ER  •  hydrOXYzine  •  Pharmacy Consult - Remdesivir  •  Pharmacy to Dose Heparin  •  Pharmacy to dose warfarin  •  prochlorperazine **OR** prochlorperazine **OR** prochlorperazine  •  sodium chloride  •  sodium chloride  •  traMADol    Assessment/Plan   Assessment & Plan     Active Hospital Problems    Diagnosis  POA   • Pneumonia due to COVID-19 virus [U07.1, J12.82]  Yes   • Acute pulmonary embolism (CMS/HCC) [I26.99]  Yes   • Hypertension [I10]  Yes      Resolved Hospital Problems   No resolved problems to display.        Brief Hospital Course to date:  Mine Curry is a 80 y.o. female with history of HTN, HLD, back pain who presented to the ED with cough and SOB for the last 10d with associated low grade fevers, and loss of taste/smell, mild diarrhea.  Diagnosed with COVID and right side PE with no evidence of heart strain.     All problems are new to me.    This patient's problems and plans were partially entered by my partner and updated as appropriate by me 01/02/21.    assessment     COVID-19 + 12/28  Hypoxia  - Sats <94% on room air on admission with CTA showing advanced pna   - Dexamethasone (12/28) day 5/10   - Remdesivir (12/28) day 5/5  - Monitor disease progression labs.  LDH up, ferritin down, fibrinogen down  - She was on antibiotics in the ED -- normal procal, normal WBC -- will hold off on additional antibiotics   - PRN albuterol; cough meds  - told her she should quarantine  through Jan 16 or so.  This will complicate INRs    Partner discussed w patient about options DOAC too expensive and not comfortable w lovenox.  Have d/w pharmacy and also anticoagulation clinic in regards to getting her in for INR checks next week.  Will see if home health can draw INR while she is in quarantine     Right side PE  --heparin gtt initiated in ED-- will continue for now and initiate coumadin with pharmacy to dose (NOAC options are not affordable)  --ECHO reviewed and normal EF-- anticoagulation discussion as above  - hep gtt.  Patient cannot afford DOAC and does not feel able to give herself LMW hep injections at home.  Plan is transition to coumadin only when INR acceptable.      Transaminitis   -Monitor while on remdesivir - stable     HTN  --continue norvasc, add lisinopril     HLD   - resumed statin, monitor LFTs      DVT prophylaxis:  Heparin gtt/coumadin      Updated patient's daughter Syl - via voicemail w general message.       Disposition: I expect the patient to be discharged home soon.  We discussed that if she doesn't feel strong enough for home tomorrow, it will be time to discuss inpt rehab.     CODE STATUS:   Code Status and Medical Interventions:   Ordered at: 12/28/20 9027     Limited Support to NOT Include:    Intubation     Level Of Support Discussed With:    Patient     Code Status:    No CPR     Medical Interventions (Level of Support Prior to Arrest):    Limited       Luz Maria Jang MD  01/02/21

## 2021-01-02 NOTE — PLAN OF CARE
Goal Outcome Evaluation:  Plan of Care Reviewed With: patient  Progress: improving   VSS, room air. Hep gtt continues, rate changed to 14un/kg/hr per order. Prn xanax admin per pt request. UO adeq. Pt has rested comfortably this shift, cont to monitor

## 2021-01-02 NOTE — PLAN OF CARE
GI Discharge Instructions Endoscopy      3/19/2019    During your exam, the physician:    Completed a thorough exam, no specimens were obtained.    DIET INSTRUCTIONS:  Resume your regular diet    PRESCRIPTIONS/MEDICATIONS  No new prescriptions given today    A RESPONSIBLE ADULT MUST ACCOMPANY YOU AND DRIVE YOU HOME    You had the following procedure(s) today:   Upper Endoscopy      There is no need to hold any aspirin or anti-inflammatory medications.     Following sedation, your judgement, perception and coordination are impaired for a minimum of       24 hours.      Therefore:  · Do not drive.  Do not return to work today.  · Do not operate appliances or machinery that require quick reaction time  · Do not sign legal documents or be involved in work decisions  · Do not smoke or drink alcoholic beverages for 24 hours  · Plan to spend a few hours resting before resuming your normal routine    Please call your physician in the event that you experience any of the following or proceed to     the nearest hospital in the event of an emergency:     UPPER ENDOSCOPY:    Difficulty swallowing or breathing  Neck swelling  Excessive pain, you may have mild chest pain or discomfort which should pass within 1-2 hours with the passage of air.  Nausea or Vomiting  Abdominal distention  Fever  A mild sore throat may follow this procedure.  Warm salt-water gargle or lozenges may relieve your discomfort.  ..    If you have any questions or concerns, contact Dr. DEANGELO Burch, [phone]    ADDITIONAL INSTRUCTIONS: none     Goal Outcome Evaluation:  Plan of Care Reviewed With: patient  Progress: improving  Outcome Summary: Patient with HTN, see new orders.  Pt. ambulated in her room on room air and SAO2 decreased to 84% but returned to >90% at rest.  Voiced she was SOA while ambulating.  Up out of bed for a couple of hours but wanted to return to bed r/t low back pain.  Ice applied with + results.  Heparin gtt continues, rate has not changed, INR 1.9.  Patient pleasant and cooperative with this writer.  IS given and educated on its use.

## 2021-01-02 NOTE — PROGRESS NOTES
"Pharmacy Consult  -  Warfarin    Mine Curry is a  80 y.o. female   Height - 162.6 cm (64\")  Weight - 75.3 kg (166 lb)    Consulting Provider: - Radha Caro MD  Indication: - PE  Goal INR: - 2-3  Home Regimen:   - new start    Bridge Therapy: Yes   and unfractionated heparin    Drug-Drug Interactions with current regimen:   Aspirin - may increase bleeding risk              Dexamethasone - may increase or decrease warfarin effect              Pantoprazole - may increase INR    Warfarin Dosing During Admission:    Date  12/30 12/31 1/1         INR  1.18 1.3 1.6         Dose  5mg 5mg (5mg)              Education Provided: Warfarin education provided on 12/31 verbally and in writing.  Discussed effects of warfarin, importance of checking INR, drug-drug and drug-food interactions, and signs/symptoms of bleeding and clotting.  Patient verbalized understanding through teach back.  All pertinent questions were answered.        Discharge Follow up:   Following Provider -  [facility]   Follow up time range or appointment - 2-3 days following discharge      Labs:    Results from last 7 days   Lab Units 01/02/21 0338 01/01/21  1842 01/01/21  0841 01/01/21  0034 12/31/20  0647 12/31/20  0013 12/30/20  0536 12/29/20  1110 12/28/20  1646 12/28/20  0925   INR  1.90*  --  1.60*  --  1.32*  --  1.18*  --  1.18*  --    APTT seconds  --  103.3* 109.7*  --  74.8 57.7  --   --  30.5*  --    HEMOGLOBIN g/dL 13.2  --   --  12.2 12.8  --  11.6* 12.3 12.2 12.7   HEMATOCRIT % 40.8  --   --  37.4 39.9  --  36.9 38.3 38.3 39.1     Results from last 7 days   Lab Units 01/02/21 0338 01/01/21  0841 12/31/20  0647  12/28/20  0925   SODIUM mmol/L  --   --   --   --  145   POTASSIUM mmol/L  --   --   --   --  3.5   CHLORIDE mmol/L  --   --   --   --  108*   CO2 mmol/L  --   --   --   --  24.0   BUN mg/dL  --   --   --   --  10   CREATININE mg/dL 0.53* 0.53* 0.61   < > 0.54*   CALCIUM mg/dL  --   --   --   --  9.3 "   BILIRUBIN mg/dL 0.2 0.2 0.2   < > 0.5   ALK PHOS U/L 80 83 98   < > 105   ALT (SGPT) U/L 40* 45* 41*   < > 80*   AST (SGOT) U/L 26 36* 23   < > 51*   GLUCOSE mg/dL  --   --   --   --  115*    < > = values in this interval not displayed.       Current dietary intake: 100% of documented meals  Diet Order   Procedures    Diet Regular       Assessment/Plan:   Pharmacy to dose Warfarin secondary to PE.   Goal INR: 2-3  1/2 INR - 1.9. increased from 1.6  1/2 H/H - 13.2/40.8    Continue warfarin 5mg daily.   Continue heparin drip until INR>2 x24hrs, min 5 days  Monitor s/s bleeding, clinical status, dietary intake and drug-drug interactions  Follow daily INR and adjust dose appropriately    Thanks  Baltazar Calderon [facility]   1/2/2021  11:07 EST

## 2021-01-03 LAB
ALBUMIN SERPL-MCNC: 3.3 G/DL (ref 3.5–5.2)
ALP SERPL-CCNC: 79 U/L (ref 39–117)
ALT SERPL W P-5'-P-CCNC: 34 U/L (ref 1–33)
AST SERPL-CCNC: 21 U/L (ref 1–32)
BASOPHILS # BLD MANUAL: 0 10*3/MM3 (ref 0–0.2)
BASOPHILS NFR BLD AUTO: 0 % (ref 0–1.5)
BILIRUB CONJ SERPL-MCNC: <0.2 MG/DL (ref 0–0.3)
BILIRUB INDIRECT SERPL-MCNC: ABNORMAL MG/DL
BILIRUB SERPL-MCNC: 0.2 MG/DL (ref 0–1.2)
CK SERPL-CCNC: 17 U/L (ref 20–180)
CREAT SERPL-MCNC: 0.7 MG/DL (ref 0.57–1)
CRP SERPL-MCNC: 0.39 MG/DL (ref 0–0.5)
DEPRECATED RDW RBC AUTO: 49 FL (ref 37–54)
EOSINOPHIL # BLD MANUAL: 0 10*3/MM3 (ref 0–0.4)
EOSINOPHIL NFR BLD MANUAL: 0 % (ref 0.3–6.2)
ERYTHROCYTE [DISTWIDTH] IN BLOOD BY AUTOMATED COUNT: 13.8 % (ref 12.3–15.4)
FERRITIN SERPL-MCNC: 261.4 NG/ML (ref 13–150)
GFR SERPL CREATININE-BSD FRML MDRD: 81 ML/MIN/1.73
HCT VFR BLD AUTO: 43.1 % (ref 34–46.6)
HGB BLD-MCNC: 13.3 G/DL (ref 12–15.9)
INR PPP: 2.94 (ref 0.85–1.16)
LYMPHOCYTES # BLD MANUAL: 1.87 10*3/MM3 (ref 0.7–3.1)
LYMPHOCYTES NFR BLD MANUAL: 12 % (ref 19.6–45.3)
LYMPHOCYTES NFR BLD MANUAL: 4 % (ref 5–12)
MCH RBC QN AUTO: 30 PG (ref 26.6–33)
MCHC RBC AUTO-ENTMCNC: 30.9 G/DL (ref 31.5–35.7)
MCV RBC AUTO: 97.3 FL (ref 79–97)
METAMYELOCYTES NFR BLD MANUAL: 3 % (ref 0–0)
MONOCYTES # BLD AUTO: 0.62 10*3/MM3 (ref 0.1–0.9)
MYELOCYTES NFR BLD MANUAL: 2 % (ref 0–0)
NEUTROPHILS # BLD AUTO: 12.32 10*3/MM3 (ref 1.7–7)
NEUTROPHILS NFR BLD MANUAL: 77 % (ref 42.7–76)
NEUTS BAND NFR BLD MANUAL: 2 % (ref 0–5)
PLAT MORPH BLD: NORMAL
PLATELET # BLD AUTO: 434 10*3/MM3 (ref 140–450)
PMV BLD AUTO: 10 FL (ref 6–12)
PROT SERPL-MCNC: 6.3 G/DL (ref 6–8.5)
PROTHROMBIN TIME: 30.3 SECONDS (ref 11.5–14)
QT INTERVAL: 402 MS
QTC INTERVAL: 486 MS
RBC # BLD AUTO: 4.43 10*6/MM3 (ref 3.77–5.28)
RBC MORPH BLD: NORMAL
SCAN SLIDE: NORMAL
UFH PPP CHRO-ACNC: 0.63 IU/ML (ref 0.3–0.7)
WBC # BLD AUTO: 15.59 10*3/MM3 (ref 3.4–10.8)
WBC MORPH BLD: NORMAL

## 2021-01-03 PROCEDURE — 82565 ASSAY OF CREATININE: CPT | Performed by: NURSE PRACTITIONER

## 2021-01-03 PROCEDURE — 85520 HEPARIN ASSAY: CPT

## 2021-01-03 PROCEDURE — 82550 ASSAY OF CK (CPK): CPT | Performed by: NURSE PRACTITIONER

## 2021-01-03 PROCEDURE — 63710000001 DEXAMETHASONE PER 0.25 MG: Performed by: INTERNAL MEDICINE

## 2021-01-03 PROCEDURE — 99232 SBSQ HOSP IP/OBS MODERATE 35: CPT | Performed by: NURSE PRACTITIONER

## 2021-01-03 PROCEDURE — 85007 BL SMEAR W/DIFF WBC COUNT: CPT | Performed by: NURSE PRACTITIONER

## 2021-01-03 PROCEDURE — 86140 C-REACTIVE PROTEIN: CPT | Performed by: NURSE PRACTITIONER

## 2021-01-03 PROCEDURE — 80076 HEPATIC FUNCTION PANEL: CPT | Performed by: NURSE PRACTITIONER

## 2021-01-03 PROCEDURE — 85025 COMPLETE CBC W/AUTO DIFF WBC: CPT | Performed by: NURSE PRACTITIONER

## 2021-01-03 PROCEDURE — 82728 ASSAY OF FERRITIN: CPT | Performed by: NURSE PRACTITIONER

## 2021-01-03 PROCEDURE — 85610 PROTHROMBIN TIME: CPT | Performed by: INTERNAL MEDICINE

## 2021-01-03 RX ORDER — WARFARIN SODIUM 2.5 MG/1
2.5 TABLET ORAL
Status: DISCONTINUED | OUTPATIENT
Start: 2021-01-03 | End: 2021-01-04

## 2021-01-03 RX ADMIN — ASPIRIN 81 MG CHEWABLE TABLET 81 MG: 81 TABLET CHEWABLE at 08:00

## 2021-01-03 RX ADMIN — TRAMADOL HYDROCHLORIDE 50 MG: 50 TABLET, COATED ORAL at 12:18

## 2021-01-03 RX ADMIN — AMLODIPINE BESYLATE 10 MG: 10 TABLET ORAL at 08:00

## 2021-01-03 RX ADMIN — TRAMADOL HYDROCHLORIDE 50 MG: 50 TABLET, COATED ORAL at 22:22

## 2021-01-03 RX ADMIN — SODIUM CHLORIDE, PRESERVATIVE FREE 10 ML: 5 INJECTION INTRAVENOUS at 08:01

## 2021-01-03 RX ADMIN — TRAMADOL HYDROCHLORIDE 50 MG: 50 TABLET, COATED ORAL at 06:15

## 2021-01-03 RX ADMIN — WARFARIN SODIUM 2.5 MG: 2.5 TABLET ORAL at 17:02

## 2021-01-03 RX ADMIN — SODIUM CHLORIDE, PRESERVATIVE FREE 10 ML: 5 INJECTION INTRAVENOUS at 22:19

## 2021-01-03 RX ADMIN — DEXAMETHASONE 6 MG: 4 TABLET ORAL at 08:00

## 2021-01-03 RX ADMIN — ALPRAZOLAM 0.25 MG: 0.25 TABLET ORAL at 06:32

## 2021-01-03 RX ADMIN — LISINOPRIL 10 MG: 10 TABLET ORAL at 08:00

## 2021-01-03 RX ADMIN — PANTOPRAZOLE SODIUM 40 MG: 40 TABLET, DELAYED RELEASE ORAL at 06:15

## 2021-01-03 RX ADMIN — PRAVASTATIN SODIUM 40 MG: 40 TABLET ORAL at 22:18

## 2021-01-03 RX ADMIN — ALPRAZOLAM 0.25 MG: 0.25 TABLET ORAL at 22:22

## 2021-01-03 NOTE — PROGRESS NOTES
HEPARIN INFUSION  Mine Curry is a  80 y.o. female receiving heparin infusion.     Therapy for (VTE/Cardiac): VTE  Patient Weight: 75.3 kg  Initial Bolus (Y/N): Y  Any Bolus (Y/N): Y    Signs or Symptoms of Bleeding: none.     VTE (PE/DVT)   Initial Bolus: 80 units/kg (Max 10,000 units)  Initial rate: 18 units/kg/hr (Max 1,500 units/hr)      (Anti-Xa) (IU/mL) Bolus Dose Stop Infusion Rate Change Repeat (Anti-Xa)     ? 0.19 80 units/kg 0 hrs Increase rate by   4 units/kg/hr 6 hrs      0.2 - 0.29 40 units/kg 0 hrs Increase rate by 2 units/kg/hr 6 hrs    0.3 - 0.7  0 0 hrs No change 6 hrs      0.71 - 0.99   0  0 hrs Decrease rate by 2 units/kg/hr 6 hrs      ? 1 0 Hold 1 hr Decrease rate by 3 units/kg/hr 6 hrs          Results from last 7 days   Lab Units 01/03/21  0655 01/02/21  0338 01/01/21  0841 01/01/21  0034 12/31/20  0647   INR   --  1.90* 1.60*  --  1.32*   HEMOGLOBIN g/dL 13.3 13.2  --  12.2 12.8   HEMATOCRIT % 43.1 40.8  --  37.4 39.9   PLATELETS 10*3/mm3 434 478*  --  439 450          Date   Time   Anti-Xa/ aPTT  Current Rate (Unit/kg/hr) Bolus   (Units) Rate Change   (Unit/kg/hr) New Rate (Unit/kg/hr) Next   Anti-Xa Comments  Pump Check Daily   12.28 1700   6020  18 2300 S/w petra in ED   12/29 0015 0.50 18 -- -- 18 0600 D/w RN   12/29 1110 0.42 18 -- -- 18 0600 Theresa 3244, verified   12/30 0536 0.46 18 -- -- 18  D/C'ed 12/30am, apixaban 10mg x1 given, apixaban d/c'ed to restart heparin   12/30 Restart @1800  0 -- +18 18 0000 Theresa 3243   12/31 0100       Changed to aPTT, antiXa level >1.1 d/t eliquis this morning    12/31 0330 57.7 18 -- -- 18 0800 D/w ANDREI   12/31 0647 74.8 18 -- -- 18 0600 Franco [facility], verified   1/1 0841 109.7 18  -2 16 1700 D/W ANDREI    1/1 1842 103.3  0.77 16 -- -2 14 0200 D/W ANDREI [place]    1/2 0338 0.42 14 -- -- 14 1000 [facility]   1/2 1003 0.63 14 -- -- 14 0600 Ricky 3242   1/3 0655 0.63  14 -- -- 14 0600 [place] 3242                                                                                            Baltazar Calderon, [facility]  1/3/2021   08:04 EST

## 2021-01-03 NOTE — PROGRESS NOTES
[facility] Medicine Services  PROGRESS NOTE    Patient Name: Mine Curry  : 1940  MRN: [id number]    Date of Admission: 2020  Primary Care Physician: Eleni Hunter MD    Subjective   Subjective     CC:  Hospital follow up for Covid 19    HPI:  Called patient on the phone this afternoon to check in.  Reports that she has been able to walk around the room with assistance but is weaker than her norm.  She would like to speak with a  about the potential for rehab.  No overnight issues.        ROS:  Gen- No fevers, chills, + generalized weakness.  CV- No chest pain, palpitations  Resp- No cough or dyspnea at rest, + dyspnea with exertion  GI- No N/V/D, abd pain        Objective   Objective     Vital Signs:   Temp:  [96.3 °F (35.7 °C)-97.6 °F (36.4 °C)] 96.8 °F (36 °C)  Heart Rate:  [46-65] 58  Resp:  [18] 18  BP: (109-147)/(76-97) 147/97       The following exam was performed via teleconference in the interest of PPE conservation.      Physical Exam:  Constitutional: No acute distress, awake, alert, resting upright in bed  HENT: NCAT, mucous membranes moist  Respiratory: breathing unlabored on room air  Cardiovascular: bradycardia on monitor  Musculoskeletal: No bilateral ankle edema noted from window  Psychiatric: Appropriate affect, cooperative  Neurologic: Oriented x 3, speech clear  Skin: No rashes noted to exposed skin         Results Reviewed:  Results from last 7 days   Lab Units 21  0655 21  0338 21  0841 21  0034  20  2229  20  0925   WBC 10*3/mm3 15.59* 15.37*  --  12.95*   < >  --    < > 8.20   HEMOGLOBIN g/dL 13.3 13.2  --  12.2   < >  --    < > 12.7   HEMATOCRIT % 43.1 40.8  --  37.4   < >  --    < > 39.1   PLATELETS 10*3/mm3 434 478*  --  439   < >  --    < > 326   INR  2.94* 1.90* 1.60*  --    < >  --    < >  --    PROCALCITONIN ng/mL  --   --   --   --   --  0.04  --  0.05    < > = values in this interval  not displayed.     Results from last 7 days   Lab Units 01/02/21  0338 01/01/21  0841 12/31/20  0647  12/28/20  0925   SODIUM mmol/L  --   --   --   --  145   POTASSIUM mmol/L  --   --   --   --  3.5   CHLORIDE mmol/L  --   --   --   --  108*   CO2 mmol/L  --   --   --   --  24.0   BUN mg/dL  --   --   --   --  10   CREATININE mg/dL 0.53* 0.53* 0.61   < > 0.54*   GLUCOSE mg/dL  --   --   --   --  115*   CALCIUM mg/dL  --   --   --   --  9.3   ALT (SGPT) U/L 40* 45* 41*   < > 80*   AST (SGOT) U/L 26 36* 23   < > 51*   TROPONIN T ng/mL  --   --   --   --  <0.010   PROBNP pg/mL  --   --   --   --  182.9    < > = values in this interval not displayed.     Estimated Creatinine Clearance: 56.6 mL/min (A) (by C-G formula based on SCr of 0.53 mg/dL (L)).    Microbiology Results Abnormal     Procedure Component Value - Date/Time    Blood Culture - Blood, Arm, Left [[id number]] Collected: 12/28/20 1050    Lab Status: Final result Specimen: Blood from Arm, Left Updated: 01/02/21 1345     Blood Culture No growth at 5 days    Blood Culture - Blood, Arm, Left [[id number]] Collected: 12/28/20 1055    Lab Status: Final result Specimen: Blood from Arm, Left Updated: 01/02/21 1345     Blood Culture No growth at 5 days    COVID PRE-OP / PRE-PROCEDURE SCREENING ORDER (NO ISOLATION) - Swab, Nasopharynx [[id number]]  (Abnormal) Collected: 12/28/20 1136    Lab Status: Final result Specimen: Swab from Nasopharynx Updated: 12/28/20 1335    Narrative:      The following orders were created for panel order COVID PRE-OP / PRE-PROCEDURE SCREENING ORDER (NO ISOLATION) - Swab, Nasopharynx.  Procedure                               Abnormality         Status                     ---------                               -----------         ------                     Respiratory Panel PCR w/...[[id number]]  Abnormal            Final result                 Please view results for these tests on the individual orders.    Respiratory Panel PCR  w/COVID-19(SARS-CoV-2) VALERIA/ALEX/EVERT/PAD/COR/MAD/JEFF In-House, NP Swab in UTM/VTM, 3-4 HR TAT - Swab, Nasopharynx [[id number]]  (Abnormal) Collected: 12/28/20 1136    Lab Status: Final result Specimen: Swab from Nasopharynx Updated: 12/28/20 4617     ADENOVIRUS, PCR Not Detected     Coronavirus 229E Not Detected     Coronavirus HKU1 Not Detected     Coronavirus NL63 Not Detected     Coronavirus OC43 Not Detected     COVID19 Detected     Human Metapneumovirus Not Detected     Human Rhinovirus/Enterovirus Not Detected     Influenza A PCR Not Detected     Influenza A H1 Not Detected     Influenza A H1 2009 PCR Not Detected     Influenza A H3 Not Detected     Influenza B PCR Not Detected     Parainfluenza Virus 1 Not Detected     Parainfluenza Virus 2 Not Detected     Parainfluenza Virus 3 Not Detected     Parainfluenza Virus 4 Not Detected     RSV, PCR Not Detected     Bordetella pertussis pcr Not Detected     Bordetella parapertussis PCR Not Detected     Chlamydophila pneumoniae PCR Not Detected     Mycoplasma pneumo by PCR Not Detected    Narrative:      Fact sheet for providers: https://docs.[employer]/wp-content/uploads/KBQ0995-7670-GY3.1-EUA-Provider-Fact-Sheet-3.pdf    Fact sheet for patients: https://docs.[employer]/wp-content/uploads/XNG5471-5692-EK9.1-EUA-Patient-Fact-Sheet-1.pdf    Test performed by PCR.          Imaging Results (Last 24 Hours)     ** No results found for the last 24 hours. **          Results for orders placed during the hospital encounter of 12/28/20   Adult Transthoracic Echo Complete W/ Cont if Necessary Per Protocol    Narrative · Left ventricular ejection fraction appears to be 66 - 70%.  · Sigmoid-shaped ventricular septum is present  · Normal right ventricular cavity size, wall thickness, systolic function   and septal motion noted.  · Mild tricuspid valve regurgitation is present. Estimated right   ventricular systolic pressure from tricuspid regurgitation is moderately   elevated  (45-55 mmHg).  · There is no evidence of pericardial effusion          I have reviewed the medications:  Scheduled Meds:amLODIPine, 10 mg, Oral, Daily  aspirin, 81 mg, Oral, Daily  dexamethasone, 6 mg, Oral, Daily With Breakfast  lisinopril, 10 mg, Oral, Q24H  pantoprazole, 40 mg, Oral, QAM  pravastatin, 40 mg, Oral, Nightly  sodium chloride, 10 mL, Intravenous, Q12H  warfarin, 2.5 mg, Oral, Daily      Continuous Infusions:Pharmacy to dose warfarin,       PRN Meds:.•  acetaminophen  •  albuterol sulfate HFA  •  ALPRAZolam  •  baclofen  •  dextromethorphan polistirex ER  •  hydrOXYzine  •  Pharmacy to dose warfarin  •  prochlorperazine **OR** prochlorperazine **OR** prochlorperazine  •  sodium chloride  •  sodium chloride  •  traMADol    Assessment/Plan   Assessment & Plan     Active Hospital Problems    Diagnosis  POA   • Pneumonia due to COVID-19 virus [U07.1, J12.82]  Yes   • Acute pulmonary embolism (CMS/HCC) [I26.99]  Yes   • Hypertension [I10]  Yes      Resolved Hospital Problems   No resolved problems to display.        Brief Hospital Course to date:  Mine Curry is a 80 y.o. female with history of HTN, HLD, back pain who presented to the ED with cough and SOB for the last 10d with associated low grade fevers, and loss of taste/smell, mild diarrhea.  Diagnosed with COVID and right side PE with no evidence of heart strain.     This patient's problems and plans were partially entered by my partner and updated as appropriate by me 01/03/21.    assessment     COVID-19 + 12/28  Hypoxia  - Sats <94% on room air on admission with CTA showing advanced pna   - Dexamethasone (12/28) day 5/10   - Remdesivir (12/28) day 5/5  - Monitor disease progression labs.  LDH up, ferritin down, fibrinogen down  - She was on antibiotics in the ED -- normal procal, normal WBC -- will hold off on additional antibiotics   - PRN albuterol; cough meds  - told her she should quarantine through Jan 16 or so.  This will complicate  INRs    Partner discussed w patient about options DOAC too expensive and not comfortable w lovenox.  Have d/w pharmacy and also anticoagulation clinic in regards to getting her in for INR checks next week.  Will see if home health can draw INR while she is in quarantine     Right side PE  --heparin gtt initiated in ED-- INR 2.94 today- will dc heparin drip today and transition to coumadin. Pharmacy is following for assistance.    --ECHO reviewed and normal EF     Transaminitis   -Improved     HTN  --continue norvasc,  lisinopril     HLD   - resumed statin, monitor LFTs     Generalized weakness  - PT/OT consult today.      DVT prophylaxis:  coumadin       Disposition: Case management to see patient in am to discuss rehab options.      CODE STATUS:   Code Status and Medical Interventions:   Ordered at: 12/28/20 3591     Limited Support to NOT Include:    Intubation     Level Of Support Discussed With:    Patient     Code Status:    No CPR     Medical Interventions (Level of Support Prior to Arrest):    Limited       Lauren Streeter, RENNY  01/03/21

## 2021-01-03 NOTE — PLAN OF CARE
Goal Outcome Evaluation:  Plan of Care Reviewed With: patient  Progress: improving  Outcome Summary: VSS, patient reports a decrease in SOA with exertion when compared to 1-2-2021, continues on room air.  Heparin gtt d/c'd and has scheduled Coumadin at 1800.  Patient has been encouraged to ambulate in her room which she has done, has been totally independent in her room.  Contact/airborne precautions in place.  Continues with chronic back pain, both ice and Ultram given with improvment in the patients pain.  PT and Case management consults ordered to help the patient with discharge placement vs home.

## 2021-01-03 NOTE — PLAN OF CARE
"Goal Outcome Evaluation:  Plan of Care Reviewed With: patient  Progress: no change  Outcome Summary: Patient rested well overnight. States Tramadol really helps her with her back pain. Remains on heparin gtt. Remains on RA. Pt states she had \"very small\" bowel movement last night. VSS-- remains Sinus Reinier on monitor. Pt anxious about rehab. Has many questions this shift. Continue current POC.  "

## 2021-01-03 NOTE — PROGRESS NOTES
"Pharmacy Consult  -  Warfarin    Mine Curry is a  80 y.o. female   Height - 162.6 cm (64\")  Weight - 75.3 kg (166 lb)    Consulting Provider: - Radha Caro MD  Indication: - PE  Goal INR: - 2-3  Home Regimen:   - new start    Bridge Therapy: Yes   and unfractionated heparin    Drug-Drug Interactions with current regimen:   Aspirin - may increase bleeding risk              Dexamethasone - may increase or decrease warfarin effect              Pantoprazole - may increase INR    Warfarin Dosing During Admission:    Date  12/30 12/31 1/1 1/2 1/3       INR  1.18 1.3 1.6 1.9 2.94       Dose  5mg 5mg 5mg 5mg 2.5mg            Education Provided: Warfarin education provided on 12/31 verbally and in writing.  Discussed effects of warfarin, importance of checking INR, drug-drug and drug-food interactions, and signs/symptoms of bleeding and clotting.  Patient verbalized understanding through teach back.  All pertinent questions were answered.        Discharge Follow up:   Following Provider -  [facility]   Follow up time range or appointment - 2-3 days following discharge      Labs:    Results from last 7 days   Lab Units 01/03/21  0655 01/02/21  0338 01/01/21  1842 01/01/21  0841 01/01/21  0034 12/31/20  0647 12/31/20  0013 12/30/20  0536 12/29/20  1110 12/28/20  1646   INR  2.94* 1.90*  --  1.60*  --  1.32*  --  1.18*  --  1.18*   APTT seconds  --   --  103.3* 109.7*  --  74.8 57.7  --   --  30.5*   HEMOGLOBIN g/dL 13.3 13.2  --   --  12.2 12.8  --  11.6* 12.3 12.2   HEMATOCRIT % 43.1 40.8  --   --  37.4 39.9  --  36.9 38.3 38.3     Results from last 7 days   Lab Units 01/02/21  0338 01/01/21  0841 12/31/20  0647  12/28/20  0925   SODIUM mmol/L  --   --   --   --  145   POTASSIUM mmol/L  --   --   --   --  3.5   CHLORIDE mmol/L  --   --   --   --  108*   CO2 mmol/L  --   --   --   --  24.0   BUN mg/dL  --   --   --   --  10   CREATININE mg/dL 0.53* 0.53* 0.61   < > 0.54*   CALCIUM mg/dL  --   --   -- "   --  9.3   BILIRUBIN mg/dL 0.2 0.2 0.2   < > 0.5   ALK PHOS U/L 80 83 98   < > 105   ALT (SGPT) U/L 40* 45* 41*   < > 80*   AST (SGOT) U/L 26 36* 23   < > 51*   GLUCOSE mg/dL  --   --   --   --  115*    < > = values in this interval not displayed.       Current dietary intake: % of documented meals  Diet Order   Procedures    Diet Regular       Assessment/Plan:   Pharmacy to dose Warfarin secondary to PE.   Goal INR: 2-3  1/3 INR - 2.94. increased from 1.9  1/3 H/H - 13.3/43.1    With marked increase will decrease to warfarin 2.5mg daily  HH to obtain INR 1/4, follow up with [facility] 1/4  D/C heparin drip with INR>2  Monitor s/s bleeding, clinical status, dietary intake and drug-drug interactions  Follow daily INR and adjust dose appropriately    Thanks  Baltazar Calderon [facility]   1/3/2021  09:18 EST

## 2021-01-04 LAB
ALBUMIN SERPL-MCNC: 3.1 G/DL (ref 3.5–5.2)
ALP SERPL-CCNC: 69 U/L (ref 39–117)
ALT SERPL W P-5'-P-CCNC: 28 U/L (ref 1–33)
AST SERPL-CCNC: 14 U/L (ref 1–32)
BASOPHILS # BLD MANUAL: 0 10*3/MM3 (ref 0–0.2)
BASOPHILS NFR BLD AUTO: 0 % (ref 0–1.5)
BILIRUB CONJ SERPL-MCNC: <0.2 MG/DL (ref 0–0.3)
BILIRUB INDIRECT SERPL-MCNC: ABNORMAL MG/DL
BILIRUB SERPL-MCNC: 0.2 MG/DL (ref 0–1.2)
CK SERPL-CCNC: 17 U/L (ref 20–180)
CREAT SERPL-MCNC: 0.64 MG/DL (ref 0.57–1)
CRP SERPL-MCNC: 0.32 MG/DL (ref 0–0.5)
D DIMER PPP FEU-MCNC: 1.72 MCGFEU/ML (ref 0–0.56)
DEPRECATED RDW RBC AUTO: 51.6 FL (ref 37–54)
EOSINOPHIL # BLD MANUAL: 0 10*3/MM3 (ref 0–0.4)
EOSINOPHIL NFR BLD MANUAL: 0 % (ref 0.3–6.2)
ERYTHROCYTE [DISTWIDTH] IN BLOOD BY AUTOMATED COUNT: 14.4 % (ref 12.3–15.4)
FERRITIN SERPL-MCNC: 202 NG/ML (ref 13–150)
FIBRINOGEN PPP-MCNC: 337 MG/DL (ref 215–430)
GFR SERPL CREATININE-BSD FRML MDRD: 90 ML/MIN/1.73
HCT VFR BLD AUTO: 43.1 % (ref 34–46.6)
HGB BLD-MCNC: 13.1 G/DL (ref 12–15.9)
INR PPP: 3.85 (ref 0.85–1.16)
LDH SERPL-CCNC: 277 U/L (ref 135–214)
LYMPHOCYTES # BLD MANUAL: 1.86 10*3/MM3 (ref 0.7–3.1)
LYMPHOCYTES NFR BLD MANUAL: 12 % (ref 19.6–45.3)
LYMPHOCYTES NFR BLD MANUAL: 8 % (ref 5–12)
MCH RBC QN AUTO: 30.3 PG (ref 26.6–33)
MCHC RBC AUTO-ENTMCNC: 30.4 G/DL (ref 31.5–35.7)
MCV RBC AUTO: 99.5 FL (ref 79–97)
METAMYELOCYTES NFR BLD MANUAL: 3 % (ref 0–0)
MONOCYTES # BLD AUTO: 1.24 10*3/MM3 (ref 0.1–0.9)
MYELOCYTES NFR BLD MANUAL: 2 % (ref 0–0)
NEUTROPHILS # BLD AUTO: 11.65 10*3/MM3 (ref 1.7–7)
NEUTROPHILS NFR BLD MANUAL: 75 % (ref 42.7–76)
PLAT MORPH BLD: NORMAL
PLATELET # BLD AUTO: 448 10*3/MM3 (ref 140–450)
PMV BLD AUTO: 9.6 FL (ref 6–12)
PROT SERPL-MCNC: 5.9 G/DL (ref 6–8.5)
PROTHROMBIN TIME: 37.6 SECONDS (ref 11.5–14)
RBC # BLD AUTO: 4.33 10*6/MM3 (ref 3.77–5.28)
RBC MORPH BLD: NORMAL
WBC # BLD AUTO: 15.53 10*3/MM3 (ref 3.4–10.8)
WBC MORPH BLD: NORMAL

## 2021-01-04 PROCEDURE — 85025 COMPLETE CBC W/AUTO DIFF WBC: CPT | Performed by: NURSE PRACTITIONER

## 2021-01-04 PROCEDURE — 85384 FIBRINOGEN ACTIVITY: CPT | Performed by: NURSE PRACTITIONER

## 2021-01-04 PROCEDURE — 82550 ASSAY OF CK (CPK): CPT | Performed by: NURSE PRACTITIONER

## 2021-01-04 PROCEDURE — 63710000001 DEXAMETHASONE PER 0.25 MG: Performed by: INTERNAL MEDICINE

## 2021-01-04 PROCEDURE — 80076 HEPATIC FUNCTION PANEL: CPT | Performed by: NURSE PRACTITIONER

## 2021-01-04 PROCEDURE — 85379 FIBRIN DEGRADATION QUANT: CPT | Performed by: NURSE PRACTITIONER

## 2021-01-04 PROCEDURE — 97530 THERAPEUTIC ACTIVITIES: CPT

## 2021-01-04 PROCEDURE — 99232 SBSQ HOSP IP/OBS MODERATE 35: CPT | Performed by: NURSE PRACTITIONER

## 2021-01-04 PROCEDURE — 85007 BL SMEAR W/DIFF WBC COUNT: CPT | Performed by: NURSE PRACTITIONER

## 2021-01-04 PROCEDURE — 85610 PROTHROMBIN TIME: CPT | Performed by: INTERNAL MEDICINE

## 2021-01-04 PROCEDURE — 97161 PT EVAL LOW COMPLEX 20 MIN: CPT

## 2021-01-04 PROCEDURE — 83615 LACTATE (LD) (LDH) ENZYME: CPT | Performed by: NURSE PRACTITIONER

## 2021-01-04 PROCEDURE — 82565 ASSAY OF CREATININE: CPT | Performed by: NURSE PRACTITIONER

## 2021-01-04 PROCEDURE — 86140 C-REACTIVE PROTEIN: CPT | Performed by: NURSE PRACTITIONER

## 2021-01-04 PROCEDURE — 82728 ASSAY OF FERRITIN: CPT | Performed by: NURSE PRACTITIONER

## 2021-01-04 RX ORDER — BISACODYL 10 MG
10 SUPPOSITORY, RECTAL RECTAL DAILY PRN
Status: DISCONTINUED | OUTPATIENT
Start: 2021-01-04 | End: 2021-01-05 | Stop reason: HOSPADM

## 2021-01-04 RX ORDER — AMOXICILLIN 250 MG
2 CAPSULE ORAL 2 TIMES DAILY
Status: DISCONTINUED | OUTPATIENT
Start: 2021-01-04 | End: 2021-01-05 | Stop reason: HOSPADM

## 2021-01-04 RX ADMIN — BISACODYL 10 MG: 10 SUPPOSITORY RECTAL at 10:38

## 2021-01-04 RX ADMIN — PRAVASTATIN SODIUM 40 MG: 40 TABLET ORAL at 20:41

## 2021-01-04 RX ADMIN — ASPIRIN 81 MG CHEWABLE TABLET 81 MG: 81 TABLET CHEWABLE at 10:37

## 2021-01-04 RX ADMIN — DOCUSATE SODIUM 50MG AND SENNOSIDES 8.6MG 2 TABLET: 8.6; 5 TABLET, FILM COATED ORAL at 20:41

## 2021-01-04 RX ADMIN — LISINOPRIL 10 MG: 10 TABLET ORAL at 10:37

## 2021-01-04 RX ADMIN — PANTOPRAZOLE SODIUM 40 MG: 40 TABLET, DELAYED RELEASE ORAL at 04:08

## 2021-01-04 RX ADMIN — ALPRAZOLAM 0.25 MG: 0.25 TABLET ORAL at 17:07

## 2021-01-04 RX ADMIN — BISACODYL 10 MG: 10 SUPPOSITORY RECTAL at 04:09

## 2021-01-04 RX ADMIN — DOCUSATE SODIUM 50MG AND SENNOSIDES 8.6MG 2 TABLET: 8.6; 5 TABLET, FILM COATED ORAL at 04:09

## 2021-01-04 RX ADMIN — ALPRAZOLAM 0.25 MG: 0.25 TABLET ORAL at 10:33

## 2021-01-04 RX ADMIN — TRAMADOL HYDROCHLORIDE 50 MG: 50 TABLET, COATED ORAL at 04:26

## 2021-01-04 RX ADMIN — AMLODIPINE BESYLATE 10 MG: 10 TABLET ORAL at 10:37

## 2021-01-04 RX ADMIN — SODIUM CHLORIDE, PRESERVATIVE FREE 10 ML: 5 INJECTION INTRAVENOUS at 20:41

## 2021-01-04 RX ADMIN — SODIUM CHLORIDE, PRESERVATIVE FREE 10 ML: 5 INJECTION INTRAVENOUS at 10:39

## 2021-01-04 RX ADMIN — DEXAMETHASONE 6 MG: 4 TABLET ORAL at 10:34

## 2021-01-04 RX ADMIN — TRAMADOL HYDROCHLORIDE 50 MG: 50 TABLET, COATED ORAL at 10:39

## 2021-01-04 RX ADMIN — TRAMADOL HYDROCHLORIDE 50 MG: 50 TABLET, COATED ORAL at 17:07

## 2021-01-04 NOTE — PROGRESS NOTES
"                  Clinical Nutrition     Reason for Visit: [facility]    Patient Name: Mine Curry  YOB: 1940  MRN: [id number]  Date of Encounter: 01/04/21 09:34 EST  Admission date: 12/28/2020    Nutrition Assessment   Admission Problem List:    Pneumonia due to COVID-19 virus    Acute pulmonary embolism (CMS/HCC)    Applicable PMH:    Hypertension    Arthritis     GERD    Palpitations     Reported/Observed/Food/Nutrition Related History:     Good intake     Anthropometrics   Height: Height: 162.6 cm (64\")  Weight: Weight: 75.3 kg (166 lb) (12/29/20 1040)  BMI: BMI (Calculated): 28.5  BMI classification: Overweight: 25.0-29.9kg/m2    Labs reviewed     Results from last 7 days   Lab Units 01/04/21  0711   CREATININE mg/dL 0.64   BILIRUBIN mg/dL 0.2   ALK PHOS U/L 69   ALT (SGPT) U/L 28   AST (SGOT) U/L 14       Results from last 7 days   Lab Units 01/02/21  0717   GLUCOSE mg/dL 82     No results found for: HGBA1C    Medications reviewed   Pertinent: yes     Current Nutrition Prescription     PO: Diet Regular  Oral Nutrition Supplement:     Evaluation of Received Nutrient/Fluid Intake:  4 Days:  84% x 8 meals     Nutrition Diagnosis   Date: 1/4 Updated:   Problem No nutrition diagnosis at this time   Etiology    Signs/Symptoms    Status:     Nutrition Intervention   1.  Follow treatment progress, Care plan reviewed      Goal:   General: Maintain nutrition  PO: Maintain intake      Monitoring/Evaluation:   Per protocol, PO intake, Pertinent labs    Will Continue to follow per protocol      Clementina Bull RD  Time Spent: 15min     "

## 2021-01-04 NOTE — PLAN OF CARE
Goal Outcome Evaluation:  Plan of Care Reviewed With: patient  Progress: improving  Outcome Summary: PT initial evaluation completed for pt presenting with generalized weakness, SOA, and decreased functional mobility. Pt ambulated 80ft with CGA x2, limited by increased SOA. Pt's decreased independence warrants PT skilled care. Recommend D/C to IP rehab facility based on current status.

## 2021-01-04 NOTE — PROGRESS NOTES
"Pharmacy Consult  -  Warfarin    Mine Curry is a  80 y.o. female   Height - 162.6 cm (64\")  Weight - 75.3 kg (166 lb)    Consulting Provider: - Radha Caro MD  Indication: - PE  Goal INR: - 2-3  Home Regimen:   - new start    Bridge Therapy: Yes   and unfractionated heparin    Drug-Drug Interactions with current regimen:   Aspirin - may increase bleeding risk              Dexamethasone - may increase or decrease warfarin effect              Pantoprazole - may increase INR    Warfarin Dosing During Admission:    Date  12/30 12/31 1/1 1/2 1/3 1/4      INR  1.18 1.3 1.6 1.9 2.94 3.85      Dose  5mg 5mg 5mg 5mg 2.5mg  0          Education Provided: Warfarin education provided on 12/31 verbally and in writing.  Discussed effects of warfarin, importance of checking INR, drug-drug and drug-food interactions, and signs/symptoms of bleeding and clotting.  Patient verbalized understanding through teach back.  All pertinent questions were answered.        Discharge Follow up:   Following Provider -  [facility]   Follow up time range or appointment - 2-3 days following discharge      Labs:    Results from last 7 days   Lab Units 01/04/21  0711 01/03/21  0655 01/02/21  0338 01/01/21  1842 01/01/21  0841 01/01/21  0034 12/31/20  0647 12/31/20  0013 12/30/20  0536 12/29/20  1110 12/28/20  1646   INR  3.85* 2.94* 1.90*  --  1.60*  --  1.32*  --  1.18*  --  1.18*   APTT seconds  --   --   --  103.3* 109.7*  --  74.8 57.7  --   --  30.5*   HEMOGLOBIN g/dL 13.1 13.3 13.2  --   --  12.2 12.8  --  11.6* 12.3 12.2   HEMATOCRIT % 43.1 43.1 40.8  --   --  37.4 39.9  --  36.9 38.3 38.3     Results from last 7 days   Lab Units 01/04/21  0711 01/03/21  1341 01/02/21  0338  12/28/20  0925   SODIUM mmol/L  --   --   --   --  145   POTASSIUM mmol/L  --   --   --   --  3.5   CHLORIDE mmol/L  --   --   --   --  108*   CO2 mmol/L  --   --   --   --  24.0   BUN mg/dL  --   --   --   --  10   CREATININE mg/dL 0.64 0.70 " 0.53*   < > 0.54*   CALCIUM mg/dL  --   --   --   --  9.3   BILIRUBIN mg/dL 0.2 0.2 0.2   < > 0.5   ALK PHOS U/L 69 79 80   < > 105   ALT (SGPT) U/L 28 34* 40*   < > 80*   AST (SGOT) U/L 14 21 26   < > 51*   GLUCOSE mg/dL  --   --   --   --  115*    < > = values in this interval not displayed.       Current dietary intake: 75% of documented meals  Diet Order   Procedures   • Diet Regular       Assessment/Plan:   Pharmacy to dose Warfarin secondary to PE.   Goal INR: 2-3  1/4 INR - 3.85. increased from 2.94  1/4 H/H - 13.1/43.1    With marked increase in INR will hold warfarin doses  Monitor s/s bleeding, clinical status, dietary intake and drug-drug interactions  Follow daily INR and adjust dose appropriately    Thanks  Baltazar Calderon [facility]   1/4/2021  09:11 EST

## 2021-01-04 NOTE — PROGRESS NOTES
[facility] Medicine Services  PROGRESS NOTE    Patient Name: Mine Curry  : 1940  MRN: [id number]    Date of Admission: 2020  Primary Care Physician: Eleni Hunter MD    Subjective   Subjective     CC:  Hospital follow up for Covid 19    HPI:      Seen this morning from the window.  Patient up in chair.  Feels weaker today and continues to have some dyspnea with exertion.  Has been seen by PT today.         ROS:  Gen- No fevers, chills, + generalized weakness.  CV- No chest pain, palpitations  Resp- No cough or dyspnea at rest, + dyspnea with exertion  GI- No N/V/D, abd pain        Objective   Objective     Vital Signs:   Temp:  [96 °F (35.6 °C)-96.9 °F (36.1 °C)] 96 °F (35.6 °C)  Heart Rate:  [44-76] 55  Resp:  [17-18] 18  BP: (118-146)/(71-86) 131/71       The following exam was performed via teleconference in the interest of PPE conservation.      Physical Exam:  Constitutional: No acute distress, awake, alert, up in chair  HENT: NCAT, mucous membranes moist  Respiratory: breathing unlabored on room air  Cardiovascular: bradycardia on monitor  Musculoskeletal: No bilateral ankle edema noted from window  Psychiatric: Appropriate affect, cooperative  Neurologic: Oriented x 3, speech clear  Skin: No rashes noted to exposed skin  No change in exam from 1/3         Results Reviewed:  Results from last 7 days   Lab Units 21  0711 21  0655 21  0338  20  2229   WBC 10*3/mm3 15.53* 15.59* 15.37*   < >  --    HEMOGLOBIN g/dL 13.1 13.3 13.2   < >  --    HEMATOCRIT % 43.1 43.1 40.8   < >  --    PLATELETS 10*3/mm3 448 434 478*   < >  --    INR  3.85* 2.94* 1.90*   < >  --    PROCALCITONIN ng/mL  --   --   --   --  0.04    < > = values in this interval not displayed.     Results from last 7 days   Lab Units 21  0711 21  1341 21  0338   CREATININE mg/dL 0.64 0.70 0.53*   ALT (SGPT) U/L 28 34* 40*   AST (SGOT) U/L 14 21 26          Estimated Creatinine Clearance: 56.6 mL/min (by C-G formula based on SCr of 0.64 mg/dL).    Microbiology Results Abnormal     Procedure Component Value - Date/Time    Blood Culture - Blood, Arm, Left [[id number]] Collected: 12/28/20 1050    Lab Status: Final result Specimen: Blood from Arm, Left Updated: 01/02/21 1345     Blood Culture No growth at 5 days    Blood Culture - Blood, Arm, Left [[id number]] Collected: 12/28/20 1055    Lab Status: Final result Specimen: Blood from Arm, Left Updated: 01/02/21 1345     Blood Culture No growth at 5 days    COVID PRE-OP / PRE-PROCEDURE SCREENING ORDER (NO ISOLATION) - Swab, Nasopharynx [[id number]]  (Abnormal) Collected: 12/28/20 1136    Lab Status: Final result Specimen: Swab from Nasopharynx Updated: 12/28/20 1335    Narrative:      The following orders were created for panel order COVID PRE-OP / PRE-PROCEDURE SCREENING ORDER (NO ISOLATION) - Swab, Nasopharynx.  Procedure                               Abnormality         Status                     ---------                               -----------         ------                     Respiratory Panel PCR w/...[[id number]]  Abnormal            Final result                 Please view results for these tests on the individual orders.    Respiratory Panel PCR w/COVID-19(SARS-CoV-2) VALERIA/ALEX/EVERT/PAD/COR/MAD/JEFF In-House, NP Swab in UTM/VTM, 3-4 HR TAT - Swab, Nasopharynx [[id number]]  (Abnormal) Collected: 12/28/20 1136    Lab Status: Final result Specimen: Swab from Nasopharynx Updated: 12/28/20 1335     ADENOVIRUS, PCR Not Detected     Coronavirus 229E Not Detected     Coronavirus HKU1 Not Detected     Coronavirus NL63 Not Detected     Coronavirus OC43 Not Detected     COVID19 Detected     Human Metapneumovirus Not Detected     Human Rhinovirus/Enterovirus Not Detected     Influenza A PCR Not Detected     Influenza A H1 Not Detected     Influenza A H1 2009 PCR Not Detected     Influenza A H3 Not Detected     Influenza B PCR  Not Detected     Parainfluenza Virus 1 Not Detected     Parainfluenza Virus 2 Not Detected     Parainfluenza Virus 3 Not Detected     Parainfluenza Virus 4 Not Detected     RSV, PCR Not Detected     Bordetella pertussis pcr Not Detected     Bordetella parapertussis PCR Not Detected     Chlamydophila pneumoniae PCR Not Detected     Mycoplasma pneumo by PCR Not Detected    Narrative:      Fact sheet for providers: https://docs.[employer]/wp-content/uploads/CEW4491-1718-VX7.1-EUA-Provider-Fact-Sheet-3.pdf    Fact sheet for patients: https://docs.[employer]/wp-content/uploads/YLL6952-0503-KI5.1-EUA-Patient-Fact-Sheet-1.pdf    Test performed by PCR.          Imaging Results (Last 24 Hours)     ** No results found for the last 24 hours. **          Results for orders placed during the hospital encounter of 12/28/20   Adult Transthoracic Echo Complete W/ Cont if Necessary Per Protocol    Narrative · Left ventricular ejection fraction appears to be 66 - 70%.  · Sigmoid-shaped ventricular septum is present  · Normal right ventricular cavity size, wall thickness, systolic function   and septal motion noted.  · Mild tricuspid valve regurgitation is present. Estimated right   ventricular systolic pressure from tricuspid regurgitation is moderately   elevated (45-55 mmHg).  · There is no evidence of pericardial effusion          I have reviewed the medications:  Scheduled Meds:amLODIPine, 10 mg, Oral, Daily  aspirin, 81 mg, Oral, Daily  dexamethasone, 6 mg, Oral, Daily With Breakfast  lisinopril, 10 mg, Oral, Q24H  pantoprazole, 40 mg, Oral, QAM  pravastatin, 40 mg, Oral, Nightly  senna-docusate sodium, 2 tablet, Oral, BID  sodium chloride, 10 mL, Intravenous, Q12H  warfarin (COUMADIN) (dosing per levels), , Does not apply, Daily      Continuous Infusions:Pharmacy to dose warfarin,       PRN Meds:.•  acetaminophen  •  albuterol sulfate HFA  •  ALPRAZolam  •  baclofen  •  bisacodyl  •  dextromethorphan polistirex ER  •   hydrOXYzine  •  Pharmacy to dose warfarin  •  prochlorperazine **OR** prochlorperazine **OR** prochlorperazine  •  sodium chloride  •  sodium chloride  •  traMADol    Assessment/Plan   Assessment & Plan     Active Hospital Problems    Diagnosis  POA   • Pneumonia due to COVID-19 virus [U07.1, J12.82]  Yes   • Acute pulmonary embolism (CMS/HCC) [I26.99]  Yes   • Hypertension [I10]  Yes      Resolved Hospital Problems   No resolved problems to display.        Brief Hospital Course to date:  Mine Curry is a 80 y.o. female with history of HTN, HLD, back pain who presented to the ED with cough and SOB for the last 10d with associated low grade fevers, and loss of taste/smell, mild diarrhea.  Diagnosed with COVID and right side PE with no evidence of heart strain.     This patient's problems and plans were partially entered by my partner and updated as appropriate by me 01/04/21.    Assessment/Plan:     COVID-19 + 12/28  Hypoxia  - Sats <94% on room air on admission with CTA showing advanced pna   - Dexamethasone (12/28) day 7/10   - s/p 5 days of Remdesivir (12/28)  - Monitor disease progression labs  - She was on antibiotics in the ED -- normal procal, normal WBC -- will continue to hold off on additional antibiotics   - PRN albuterol; cough meds  - told her she should quarantine through Jan 16 or so.  This will complicate INRs    Partner discussed w patient about options DOAC too expensive and not comfortable w lovenox.  Have d/w pharmacy and also anticoagulation clinic in regards to getting her in for INR checks next week.  Will see if home health can draw INR while she is in quarantine     Right side PE  --heparin gtt initiated in ED-- INR 3.85 today-   --pharmacy following for assistance with coumadin dosing.  Holding this evening's dose.   --ECHO reviewed and normal EF     Transaminitis   -Improved     HTN  --continue norvasc,  lisinopril     HLD   - resumed statin, monitor LFTs     Generalized weakness  -  PT/OT consult today.      DVT prophylaxis:  coumadin     Offered to call patient's family but she reported that she declined and reported that she has been keeping them updated.    Disposition: Case management has referred the patient to [facility] for acute rehab.     CODE STATUS:   Code Status and Medical Interventions:   Ordered at: 12/28/20 2156     Limited Support to NOT Include:    Intubation     Level Of Support Discussed With:    Patient     Code Status:    No CPR     Medical Interventions (Level of Support Prior to Arrest):    Limited       RENNY Gutiérrez  01/04/21

## 2021-01-04 NOTE — PROGRESS NOTES
Continued Stay Note   [place]     Patient Name: Mine Curry  MRN: [id number]  Today's Date: 1/4/2021    Admit Date: 12/28/2020    Discharge Plan     Row Name 01/04/21 0944       Plan    Plan  update    Patient/Family in Agreement with Plan  yes    Plan Comments  Per CM consult patient requesting information regarding rehab.  Spoke with patient via telephone regarding discharge plan and discussed rehab options.  Patient verbalizes that she does not know what she needs or wants and that she just walked with PT and does not feel that she is any weaker than she was before she came in to the hosptial but is afraid that she does not know what she needs and needs someone to help her to decide.  CM will speak with PT for recs and will call back to discuss with patient.  Patient verbalizes being relieved someone is handling this for her.  Call PT with no answer, likely in COVID room, will call back.  CM following.  Patient plan is to TBD.    Final Discharge Disposition Code  06 - home with home health care;03 - skilled nursing facility (SNF);62 - inpatient rehab facility        Discharge Codes    No documentation.             Erica Boateng, RN

## 2021-01-04 NOTE — THERAPY EVALUATION
Patient Name: Mine Curry  : 1940    MRN: [id number]                              Today's Date: 2021       Admit Date: 2020    Visit Dx:     ICD-10-CM ICD-9-CM   1. Pneumonia due to COVID-19 virus  U07.1 480.8    J12.89    2. Shortness of breath  R06.02 786.05   3. Single subsegmental pulmonary embolism without acute cor pulmonale (CMS/HCC)  I26.93 415.19     Patient Active Problem List   Diagnosis   • PVC's (premature ventricular contractions)   • Pneumonia due to COVID-19 virus   • Acute pulmonary embolism (CMS/HCC)   • Hypertension     Past Medical History:   Diagnosis Date   • Arthritis    • Atopic dermatitis 2016   • Bigeminy    • Degeneration of lumbar intervertebral disc 2018   • GERD (gastroesophageal reflux disease) 2018   • History of Holter monitoring    • HLD (hyperlipidemia) 2016   • Hypertension 2015   • Injury of back    • Measles    • Nephrolithiasis    • Osteopenia 2018   • Palpitations    • Spinal stenosis    • Spinal stenosis      Past Surgical History:   Procedure Laterality Date   • ANKLE SURGERY     • TUBAL ABDOMINAL LIGATION       General Information     Row Name 21 1135          Physical Therapy Time and Intention    Document Type  evaluation  -KG     Mode of Treatment  physical therapy  -KG     Row Name 21 1135          General Information    Patient Profile Reviewed  yes  -KG     Prior Level of Function  independent:;all household mobility;gait;transfer;ADL's;dressing;bathing  -KG     Existing Precautions/Restrictions  fall  -KG     Barriers to Rehab  none identified  -KG     Row Name 21 1135          Living Environment    Lives With  alone  -KG     Row Name 21 1135          Home Main Entrance    Number of Stairs, Main Entrance  none  -KG     Row Name 21 1135          Stairs Within Home, Primary    Number of Stairs, Within Home, Primary  none  -KG     Row Name 21 1135          Cognition     Orientation Status (Cognition)  oriented x 3 intermittent situational confusion  -KG     Row Name 01/04/21 1135          Safety Issues, Functional Mobility    Safety Issues Affecting Function (Mobility)  insight into deficits/self-awareness;safety precaution awareness;safety precautions follow-through/compliance  -KG     Impairments Affecting Function (Mobility)  balance;coordination;endurance/activity tolerance;shortness of breath;strength  -KG       User Key  (r) = Recorded By, (t) = Taken By, (c) = Cosigned By    Initials Name Provider Type    KG Christa Grijalva, PT Physical Therapist        Mobility     Row Name 01/04/21 1135          Bed Mobility    Bed Mobility  supine-sit  -KG     Supine-Sit Bell (Bed Mobility)  standby assist;verbal cues  -KG     Assistive Device (Bed Mobility)  bed rails;head of bed elevated  -KG     Comment (Bed Mobility)  VC's for sequencing. Pt did not require any physical assistance with bed mobility.  -KG     Row Name 01/04/21 1135          Transfers    Comment (Transfers)  VC's for sequencing and safe hand placement.  -KG     Row Name 01/04/21 1135          Sit-Stand Transfer    Sit-Stand Bell (Transfers)  contact guard;verbal cues  -KG     Row Name 01/04/21 1135          Gait/Stairs (Locomotion)    Bell Level (Gait)  contact guard;2 person assist;verbal cues  -KG     Distance in Feet (Gait)  80  -KG     Deviations/Abnormal Patterns (Gait)  base of support, narrow;dillon decreased;stride length decreased  -KG     Bilateral Gait Deviations  forward flexed posture  -KG     Comment (Gait/Stairs)  Pt demonstrated step through gait pattern with slow dillon and decreased step length. VC's for upright posture. Pt with intermittent periods of decreased balance and coordination. CGA x2 due to unsteadiness. Pt's mobility limited by increased SOA. O2 sats decreased to 82% on RA, but pt able to recover quickly with rest.  -KG       User Key  (r) = Recorded By, (t)  = Taken By, (c) = Cosigned By    Initials Name Provider Type    KG Christa Grijalva, PT Physical Therapist        Obj/Interventions     Row Name 01/04/21 1139          Range of Motion Comprehensive    Comment, General Range of Motion  BLE WFL  -KG     Row Name 01/04/21 1139          Strength Comprehensive (MMT)    Comment, General Manual Muscle Testing (MMT) Assessment  BLE grossly 3+/5  -KG     Row Name 01/04/21 1139          Balance    Balance Assessment  sitting static balance;standing static balance;standing dynamic balance  -KG     Static Sitting Balance  WFL;sitting, edge of bed  -KG     Static Standing Balance  WFL;supported;standing  -KG     Dynamic Standing Balance  mild impairment;supported;standing  -KG     Row Name 01/04/21 1139          Sensory Assessment (Somatosensory)    Sensory Assessment (Somatosensory)  LE sensation intact  -KG       User Key  (r) = Recorded By, (t) = Taken By, (c) = Cosigned By    Initials Name Provider Type    KG Christa Grijalva, PT Physical Therapist        Goals/Plan     Row Name 01/04/21 1141          Bed Mobility Goal 1 (PT)    Activity/Assistive Device (Bed Mobility Goal 1, PT)  sit to supine;supine to sit  -KG     Ritchie Level/Cues Needed (Bed Mobility Goal 1, PT)  independent  -KG     Time Frame (Bed Mobility Goal 1, PT)  2 weeks  -KG     Progress/Outcomes (Bed Mobility Goal 1, PT)  goal ongoing  -KG     Row Name 01/04/21 1141          Transfer Goal 1 (PT)    Activity/Assistive Device (Transfer Goal 1, PT)  sit-to-stand/stand-to-sit;bed-to-chair/chair-to-bed  -KG     Ritchie Level/Cues Needed (Transfer Goal 1, PT)  supervision required  -KG     Time Frame (Transfer Goal 1, PT)  2 weeks  -KG     Progress/Outcome (Transfer Goal 1, PT)  goal ongoing  -KG     Row Name 01/04/21 1141          Gait Training Goal 1 (PT)    Activity/Assistive Device (Gait Training Goal 1, PT)  gait (walking locomotion)  -KG     Ritchie Level (Gait Training Goal 1, PT)   standby assist  -KG     Distance (Gait Training Goal 1, PT)  200 feet  -KG     Time Frame (Gait Training Goal 1, PT)  2 weeks  -KG     Progress/Outcome (Gait Training Goal 1, PT)  goal ongoing  -KG       User Key  (r) = Recorded By, (t) = Taken By, (c) = Cosigned By    Initials Name Provider Type    KG Rositamarissamaynor Christa N, PT Physical Therapist        Clinical Impression     Row Name 01/04/21 1139          Pain    Additional Documentation  Pain Scale: Numbers Pre/Post-Treatment (Group)  -KG     Row Name 01/04/21 1139          Pain Scale: Numbers Pre/Post-Treatment    Pretreatment Pain Rating  0/10 - no pain  -KG     Posttreatment Pain Rating  0/10 - no pain  -KG     Row Name 01/04/21 1134          Plan of Care Review    Plan of Care Reviewed With  patient  -KG     Outcome Summary  PT initial evaluation completed for pt presenting with generalized weakness, SOA, and decreased functional mobility. Pt ambulated 80ft with CGA x2, limited by increased SOA. Pt's decreased independence warrants PT skilled care. Recommend D/C to IP rehab facility based on current status.  -KG     Row Name 01/04/21 1132          Therapy Assessment/Plan (PT)    Patient/Family Therapy Goals Statement (PT)  return to PLOF  -KG     Rehab Potential (PT)  good, to achieve stated therapy goals  -KG     Criteria for Skilled Interventions Met (PT)  yes;skilled treatment is necessary  -KG     Row Name 01/04/21 1138          Vital Signs    Pre Systolic BP Rehab  146  -KG     Pre Treatment Diastolic BP  82  -KG     Pretreatment Heart Rate (beats/min)  66  -KG     Posttreatment Heart Rate (beats/min)  61  -KG     Pre SpO2 (%)  96  -KG     O2 Delivery Pre Treatment  room air  -KG     Post SpO2 (%)  96  -KG     O2 Delivery Post Treatment  room air  -KG     Pre Patient Position  Supine  -KG     Intra Patient Position  Standing  -KG     Post Patient Position  Sitting  -KG     Row Name 01/04/21 1138          Positioning and Restraints    Pre-Treatment  Position  in bed  -KG     Post Treatment Position  chair  -KG     In Chair  notified nsg;reclined;call light within reach;encouraged to call for assist;RUE elevated;LUE elevated;legs elevated  -KG       User Key  (r) = Recorded By, (t) = Taken By, (c) = Cosigned By    Initials Name Provider Type    Christa Johnson, PT Physical Therapist        Outcome Measures     Row Name 01/04/21 1142          How much help from another person do you currently need...    Turning from your back to your side while in flat bed without using bedrails?  3  -KG     Moving from lying on back to sitting on the side of a flat bed without bedrails?  3  -KG     Moving to and from a bed to a chair (including a wheelchair)?  3  -KG     Standing up from a chair using your arms (e.g., wheelchair, bedside chair)?  3  -KG     Climbing 3-5 steps with a railing?  2  -KG     To walk in hospital room?  3  -KG     AM-PAC 6 Clicks Score (PT)  17  -KG     Row Name 01/04/21 1142          Functional Assessment    Outcome Measure Options  AM-PAC 6 Clicks Basic Mobility (PT)  -KG       User Key  (r) = Recorded By, (t) = Taken By, (c) = Cosigned By    Initials Name Provider Type    Christa Johnson PT Physical Therapist        Physical Therapy Education                 Title: PT OT SLP Therapies (In Progress)     Topic: Physical Therapy (In Progress)     Point: Mobility training (In Progress)     Learning Progress Summary           Patient Acceptance, E, NR by KG at 1/4/2021 0845                   Point: Home exercise program (Not Started)     Learner Progress:  Not documented in this visit.          Point: Body mechanics (In Progress)     Learning Progress Summary           Patient Acceptance, E, NR by KG at 1/4/2021 0845                   Point: Precautions (In Progress)     Learning Progress Summary           Patient Acceptance, E, NR by KG at 1/4/2021 0845                               User Key     Initials Effective Dates Name Provider  Type Discipline    KG 05/22/20 -  Christa Grijalva, PT Physical Therapist PT              PT Recommendation and Plan  Planned Therapy Interventions (PT): balance training, bed mobility training, gait training, strengthening, transfer training  Plan of Care Reviewed With: patient  Outcome Summary: PT initial evaluation completed for pt presenting with generalized weakness, SOA, and decreased functional mobility. Pt ambulated 80ft with CGA x2, limited by increased SOA. Pt's decreased independence warrants PT skilled care. Recommend D/C to IP rehab facility based on current status.     Time Calculation:   PT Charges     Row Name 01/04/21 0845             Time Calculation    Start Time  0845  -KG      PT Received On  01/04/21  -KG      PT Goal Re-Cert Due Date  01/14/21  -KG         Time Calculation- PT    Total Timed Code Minutes- PT  9 minute(s)  -KG         Timed Charges    86238 - PT Therapeutic Activity Minutes  9  -KG        User Key  (r) = Recorded By, (t) = Taken By, (c) = Cosigned By    Initials Name Provider Type    KG Christa Grijalva, PT Physical Therapist        Therapy Charges for Today     Code Description Service Date Service Provider Modifiers Qty    18940020584 HC PT EVAL LOW COMPLEXITY 4 1/4/2021 Christa Grijalva, PT GP 1    84613564154 HC PT THER SUPP EA 15 MIN 1/4/2021 Christa Grijalva, PT GP 4    64414050719 HC PT THERAPEUTIC ACT EA 15 MIN 1/4/2021 Christa Grijalva, PT GP 1          PT G-Codes  Outcome Measure Options: AM-PAC 6 Clicks Basic Mobility (PT)  AM-PAC 6 Clicks Score (PT): 17    Tonja Grijalva PT  1/4/2021

## 2021-01-05 VITALS
SYSTOLIC BLOOD PRESSURE: 128 MMHG | DIASTOLIC BLOOD PRESSURE: 64 MMHG | RESPIRATION RATE: 18 BRPM | HEART RATE: 60 BPM | BODY MASS INDEX: 28.34 KG/M2 | OXYGEN SATURATION: 96 % | WEIGHT: 166 LBS | HEIGHT: 64 IN | TEMPERATURE: 96.5 F

## 2021-01-05 PROBLEM — I26.99 OTHER ACUTE PULMONARY EMBOLISM WITHOUT ACUTE COR PULMONALE (HCC): Status: ACTIVE | Noted: 2021-01-05

## 2021-01-05 LAB
ALBUMIN SERPL-MCNC: 3.5 G/DL (ref 3.5–5.2)
ALP SERPL-CCNC: 73 U/L (ref 39–117)
ALT SERPL W P-5'-P-CCNC: 27 U/L (ref 1–33)
AST SERPL-CCNC: 14 U/L (ref 1–32)
BASOPHILS # BLD AUTO: 0.07 10*3/MM3 (ref 0–0.2)
BASOPHILS NFR BLD AUTO: 0.4 % (ref 0–1.5)
BILIRUB CONJ SERPL-MCNC: <0.2 MG/DL (ref 0–0.3)
BILIRUB INDIRECT SERPL-MCNC: NORMAL MG/DL
BILIRUB SERPL-MCNC: 0.2 MG/DL (ref 0–1.2)
CK SERPL-CCNC: 18 U/L (ref 20–180)
CREAT SERPL-MCNC: 0.6 MG/DL (ref 0.57–1)
CRP SERPL-MCNC: 0.2 MG/DL (ref 0–0.5)
DEPRECATED RDW RBC AUTO: 48.1 FL (ref 37–54)
EOSINOPHIL # BLD AUTO: 0.05 10*3/MM3 (ref 0–0.4)
EOSINOPHIL NFR BLD AUTO: 0.3 % (ref 0.3–6.2)
ERYTHROCYTE [DISTWIDTH] IN BLOOD BY AUTOMATED COUNT: 14.4 % (ref 12.3–15.4)
FERRITIN SERPL-MCNC: 201.4 NG/ML (ref 13–150)
GFR SERPL CREATININE-BSD FRML MDRD: 96 ML/MIN/1.73
HCT VFR BLD AUTO: 43.1 % (ref 34–46.6)
HGB BLD-MCNC: 13.6 G/DL (ref 12–15.9)
IMM GRANULOCYTES # BLD AUTO: 0.82 10*3/MM3 (ref 0–0.05)
IMM GRANULOCYTES NFR BLD AUTO: 4.5 % (ref 0–0.5)
INR PPP: 3.37 (ref 0.85–1.16)
LYMPHOCYTES # BLD AUTO: 1.84 10*3/MM3 (ref 0.7–3.1)
LYMPHOCYTES NFR BLD AUTO: 10.1 % (ref 19.6–45.3)
MCH RBC QN AUTO: 29.4 PG (ref 26.6–33)
MCHC RBC AUTO-ENTMCNC: 31.6 G/DL (ref 31.5–35.7)
MCV RBC AUTO: 93.3 FL (ref 79–97)
MONOCYTES # BLD AUTO: 1.55 10*3/MM3 (ref 0.1–0.9)
MONOCYTES NFR BLD AUTO: 8.5 % (ref 5–12)
NEUTROPHILS NFR BLD AUTO: 13.87 10*3/MM3 (ref 1.7–7)
NEUTROPHILS NFR BLD AUTO: 76.2 % (ref 42.7–76)
NRBC BLD AUTO-RTO: 0 /100 WBC (ref 0–0.2)
PLATELET # BLD AUTO: 493 10*3/MM3 (ref 140–450)
PMV BLD AUTO: 9.6 FL (ref 6–12)
PROT SERPL-MCNC: 6.6 G/DL (ref 6–8.5)
PROTHROMBIN TIME: 33.8 SECONDS (ref 11.5–14)
RBC # BLD AUTO: 4.62 10*6/MM3 (ref 3.77–5.28)
WBC # BLD AUTO: 18.2 10*3/MM3 (ref 3.4–10.8)

## 2021-01-05 PROCEDURE — 82728 ASSAY OF FERRITIN: CPT | Performed by: NURSE PRACTITIONER

## 2021-01-05 PROCEDURE — 63710000001 DEXAMETHASONE PER 0.25 MG: Performed by: INTERNAL MEDICINE

## 2021-01-05 PROCEDURE — 82550 ASSAY OF CK (CPK): CPT | Performed by: NURSE PRACTITIONER

## 2021-01-05 PROCEDURE — 85610 PROTHROMBIN TIME: CPT | Performed by: INTERNAL MEDICINE

## 2021-01-05 PROCEDURE — 99239 HOSP IP/OBS DSCHRG MGMT >30: CPT | Performed by: NURSE PRACTITIONER

## 2021-01-05 PROCEDURE — 97110 THERAPEUTIC EXERCISES: CPT

## 2021-01-05 PROCEDURE — 80076 HEPATIC FUNCTION PANEL: CPT | Performed by: NURSE PRACTITIONER

## 2021-01-05 PROCEDURE — 85025 COMPLETE CBC W/AUTO DIFF WBC: CPT | Performed by: NURSE PRACTITIONER

## 2021-01-05 PROCEDURE — 97165 OT EVAL LOW COMPLEX 30 MIN: CPT

## 2021-01-05 PROCEDURE — 86140 C-REACTIVE PROTEIN: CPT | Performed by: NURSE PRACTITIONER

## 2021-01-05 PROCEDURE — 82565 ASSAY OF CREATININE: CPT | Performed by: NURSE PRACTITIONER

## 2021-01-05 RX ORDER — WARFARIN SODIUM 2.5 MG/1
2.5 TABLET ORAL
Status: DISCONTINUED | OUTPATIENT
Start: 2021-01-05 | End: 2021-01-05 | Stop reason: HOSPADM

## 2021-01-05 RX ORDER — DEXAMETHASONE 6 MG/1
6 TABLET ORAL
Qty: 1 TABLET | Refills: 0
Start: 2021-01-06 | End: 2021-01-07

## 2021-01-05 RX ORDER — HYDROXYZINE HYDROCHLORIDE 25 MG/1
25 TABLET, FILM COATED ORAL 3 TIMES DAILY PRN
Start: 2021-01-05

## 2021-01-05 RX ORDER — PANTOPRAZOLE SODIUM 40 MG/1
40 TABLET, DELAYED RELEASE ORAL EVERY MORNING
Start: 2021-01-05

## 2021-01-05 RX ORDER — ACETAMINOPHEN 325 MG/1
650 TABLET ORAL EVERY 4 HOURS PRN
Start: 2021-01-05

## 2021-01-05 RX ORDER — LISINOPRIL 10 MG/1
10 TABLET ORAL
Start: 2021-01-06

## 2021-01-05 RX ORDER — TRAMADOL HYDROCHLORIDE 50 MG/1
50 TABLET ORAL EVERY 6 HOURS PRN
Start: 2021-01-05 | End: 2021-01-12

## 2021-01-05 RX ORDER — WARFARIN SODIUM 2.5 MG/1
2.5 TABLET ORAL NIGHTLY
Start: 2021-01-05

## 2021-01-05 RX ORDER — TRAMADOL HYDROCHLORIDE 50 MG/1
50 TABLET ORAL EVERY 6 HOURS PRN
Status: DISCONTINUED | OUTPATIENT
Start: 2021-01-05 | End: 2021-01-05 | Stop reason: HOSPADM

## 2021-01-05 RX ADMIN — TRAMADOL HYDROCHLORIDE 50 MG: 50 TABLET, FILM COATED ORAL at 09:49

## 2021-01-05 RX ADMIN — DOCUSATE SODIUM 50MG AND SENNOSIDES 8.6MG 2 TABLET: 8.6; 5 TABLET, FILM COATED ORAL at 09:50

## 2021-01-05 RX ADMIN — AMLODIPINE BESYLATE 10 MG: 10 TABLET ORAL at 09:50

## 2021-01-05 RX ADMIN — DEXAMETHASONE 6 MG: 4 TABLET ORAL at 09:50

## 2021-01-05 RX ADMIN — PANTOPRAZOLE SODIUM 40 MG: 40 TABLET, DELAYED RELEASE ORAL at 05:24

## 2021-01-05 RX ADMIN — ALPRAZOLAM 0.25 MG: 0.25 TABLET ORAL at 15:31

## 2021-01-05 RX ADMIN — ASPIRIN 81 MG CHEWABLE TABLET 81 MG: 81 TABLET CHEWABLE at 09:50

## 2021-01-05 RX ADMIN — ALPRAZOLAM 0.25 MG: 0.25 TABLET ORAL at 05:24

## 2021-01-05 RX ADMIN — LISINOPRIL 10 MG: 10 TABLET ORAL at 09:50

## 2021-01-05 RX ADMIN — SODIUM CHLORIDE, PRESERVATIVE FREE 10 ML: 5 INJECTION INTRAVENOUS at 09:49

## 2021-01-05 RX ADMIN — TRAMADOL HYDROCHLORIDE 50 MG: 50 TABLET, FILM COATED ORAL at 15:31

## 2021-01-05 NOTE — PROGRESS NOTES
Case Management Discharge Note      Final Note: Spoke to April with [facility] who has offered a bed on the CVA2 unit.  Spoke with patient via telephone to advise who reports she will need a ride there.  Called and made arrangement for [facility] today at 4pm.  RN notified.  Spoke with patient to advise of van time who verbalizes understanding and agreement with plan.  Patient plan is to discharge to [facility] CVA2 unit today via [facility] medical van scheduled for 1400.  Nursing to call report to [phone].    Provided Post Acute Provider List?: N/A  N/A Provider List Comment: denies need    Selected Continued Care - Admitted Since 12/28/2020     Destination    No services have been selected for the patient.              Durable Medical Equipment    No services have been selected for the patient.              Dialysis/Infusion    No services have been selected for the patient.              Home Medical Care Coordination complete    Service Provider Selected Services Address Phone Fax Patient Preferred    [facility]  [facility] [address] [address] [phone] --          Therapy    No services have been selected for the patient.              Community Resources    No services have been selected for the patient.                       Final Discharge Disposition Code: 62 - inpatient rehab facility

## 2021-01-05 NOTE — PROGRESS NOTES
Continued Stay Note   [place]     Patient Name: Mine Curry  MRN: [id number]  Today's Date: 1/5/2021    Admit Date: 12/28/2020    Discharge Plan     Row Name 01/05/21 1108       Plan    Plan  update    Patient/Family in Agreement with Plan  yes    Plan Comments  Spoke to April with [facility] regarding referral who advises that referral has been sent up for review.  Spoke with patient via telephone regarding discharge plan and advised that [facility] is reviewing her referral today and hope to hear back today as to whether they will offer a bed or not.  No new discharge needs verbalized.  CM following.  Patient plan is to discharge to [facility] if bed os offered.    Final Discharge Disposition Code  62 - inpatient rehab facility        Discharge Codes    No documentation.             Erica Boateng RN

## 2021-01-05 NOTE — PLAN OF CARE
Goal Outcome Evaluation:  Plan of Care Reviewed With: patient  Progress: no change  Outcome Summary: OT evaluation completed and treatment intiated.  Pt. MI to EOB, SBA with transfers and up to min A 70ft with mvmt. in room. Pt. was able to groom at sink, toilet SBA and I donning socks.  Pt. needs frequent rest periods throughout session. Pt completed UE TE 5-10 reps with 3 rest periods focusing on chest extension and deep breathing.  Due to patient living alone and would be on isolation recommend IRF.  Also due  to prior I ADL and all HM tasks and now with impaired endurance, strength and balance.  Cont IP OT servcies.

## 2021-01-05 NOTE — DISCHARGE SUMMARY
[facility] Medicine Services  DISCHARGE SUMMARY    Patient Name: Mine Curry  : 1940  MRN: [id number]    Date of Admission: 2020  8:57 AM  Date of Discharge:  2020  Primary Care Physician: Eleni Hunter MD    Consults     No orders found from 2020 to 2020.          Hospital Course     Presenting Problem:   Pneumonia due to COVID-19 virus [U07.1, J12.82]  Pneumonia due to COVID-19 virus [U07.1, J12.82]    Active Hospital Problems    Diagnosis  POA   • Pneumonia due to COVID-19 virus [U07.1, J12.82]  Yes   • Acute pulmonary embolism (CMS/HCC) [I26.99]  Yes   • Hypertension [I10]  Yes      Resolved Hospital Problems   No resolved problems to display.          Hospital Course:  Mine Curry is a 80 y.o. female  with history of HTN, HLD, back pain who presented to the ED with cough and SOB for the last 10d with associated low grade fevers, and loss of taste/smell, mild diarrhea.  Diagnosed with COVID and right side PE with no evidence of heart strain.      Assessment/Plan:     COVID-19 +   Hypoxia-now resolved  Now on room air  - Sats <94% on room air on admission with CTA showing advanced pna   - Dexamethasone () day 9/10   - s/p 5 days of Remdesivir ()  - She was on antibiotics in the ED -- normal procal, normal WBC -- will continue to hold off on additional antibiotics   - PRN albuterol; cough meds  - told her she should quarantine through  or so.          Right side PE  --heparin gtt initiated in ED-- she has been transitioned to coumadin and will need to continue daily INRs at rehab with adjustment of dose (goal 2-3)  --begin warfarin 2.5 mg nightly-first dose tonight.  Recommend repeat INR tomorrow morning and adjustment of dose  --ECHO reviewed and normal EF     Transaminitis   -Improved     HTN  --continue norvasc,  lisinopril     HLD   - resumed statin, monitor LFTs      Generalized weakness  - PT/OT consult today.       DVT prophylaxis:  coumadin         Disposition: Will be transferring to [facility] today      Discharge Follow Up Recommendations for outpatient labs/diagnostics:   Follow up with PCP, first available hospital follow up appt.    Day of Discharge     HPI:   Up in bed this morning.  No overnight issues.  Complains of generalized weakness.     Review of Systems  Gen- No fevers, chills, + generalized weakness  CV- No chest pain, palpitations  Resp- No cough, + dyspnea with exertion  GI- No N/V/D, abd pain    Vital Signs:   Temp:  [95.8 °F (35.4 °C)-97.5 °F (36.4 °C)] 96.5 °F (35.8 °C)  Heart Rate:  [43-68] 60  Resp:  [18] 18  BP: (119-157)/(62-96) 128/64     Physical Exam:  Constitutional: No acute distress, awake, alert, up in chair  HENT: NCAT, mucous membranes moist  Respiratory: respiratory effort normal on room air  Cardiovascular: RRR on telemetry  Musculoskeletal: No bilateral ankle edema noted to lower extremities  Psychiatric: Appropriate affect, cooperative  Neurologic: Oriented x 3, strength symmetric in all extremities, Cranial Nerves grossly intact to confrontation, speech clear  Skin: No rashes noted to exposed skin      Pertinent  and/or Most Recent Results     Results from last 7 days   Lab Units 01/05/21  1202 01/04/21  0711 01/03/21  1341 01/03/21  0655 01/02/21  0338 01/01/21  0841 01/01/21  0034 12/31/20  0647 12/30/20  0536   WBC 10*3/mm3 18.20* 15.53*  --  15.59* 15.37*  --  12.95* 12.58* 12.25*   HEMOGLOBIN g/dL 13.6 13.1  --  13.3 13.2  --  12.2 12.8 11.6*   HEMATOCRIT % 43.1 43.1  --  43.1 40.8  --  37.4 39.9 36.9   PLATELETS 10*3/mm3 493* 448  --  434 478*  --  439 450 420   CREATININE mg/dL 0.60 0.64 0.70  --  0.53* 0.53*  --  0.61 0.55*     Results from last 7 days   Lab Units 01/05/21  1202 01/04/21  0711 01/03/21  1341 01/03/21  0655 01/02/21  0338 01/01/21  1842 01/01/21  0841 12/31/20  0647 12/31/20  0013 12/30/20  0536   BILIRUBIN mg/dL 0.2 0.2 0.2  --  0.2  --  0.2 0.2  --  0.2    ALK PHOS U/L 73 69 79  --  80  --  83 98  --  88   ALT (SGPT) U/L 27 28 34*  --  40*  --  45* 41*  --  43*   AST (SGOT) U/L 14 14 21  --  26  --  36* 23  --  20   PROTIME Seconds 33.8* 37.6*  --  30.3* 21.5*  --  18.8* 16.0*  --  14.7*   INR  3.37* 3.85*  --  2.94* 1.90*  --  1.60* 1.32*  --  1.18*   APTT seconds  --   --   --   --   --  103.3* 109.7* 74.8 57.7  --            Invalid input(s): TG        Brief Urine Lab Results  (Last result in the past 365 days)      Color   Clarity   Blood   Leuk Est   Nitrite   Protein   CREAT   Urine HCG        12/28/20 1039 Yellow Clear Negative Trace Negative Negative               Microbiology Results Abnormal     Procedure Component Value - Date/Time    Blood Culture - Blood, Arm, Left [[id number]] Collected: 12/28/20 1050    Lab Status: Final result Specimen: Blood from Arm, Left Updated: 01/02/21 1345     Blood Culture No growth at 5 days    Blood Culture - Blood, Arm, Left [[id number]] Collected: 12/28/20 1055    Lab Status: Final result Specimen: Blood from Arm, Left Updated: 01/02/21 1345     Blood Culture No growth at 5 days    COVID PRE-OP / PRE-PROCEDURE SCREENING ORDER (NO ISOLATION) - Swab, Nasopharynx [[id number]]  (Abnormal) Collected: 12/28/20 1136    Lab Status: Final result Specimen: Swab from Nasopharynx Updated: 12/28/20 1335    Narrative:      The following orders were created for panel order COVID PRE-OP / PRE-PROCEDURE SCREENING ORDER (NO ISOLATION) - Swab, Nasopharynx.  Procedure                               Abnormality         Status                     ---------                               -----------         ------                     Respiratory Panel PCR w/...[[id number]]  Abnormal            Final result                 Please view results for these tests on the individual orders.    Respiratory Panel PCR w/COVID-19(SARS-CoV-2) VALERIA/ALEX/EVERT/PAD/COR/MAD/JEFF In-House, NP Swab in UTM/VTM, 3-4 HR TAT - Swab, Nasopharynx [[id number]]  (Abnormal)  Collected: 12/28/20 1136    Lab Status: Final result Specimen: Swab from Nasopharynx Updated: 12/28/20 1335     ADENOVIRUS, PCR Not Detected     Coronavirus 229E Not Detected     Coronavirus HKU1 Not Detected     Coronavirus NL63 Not Detected     Coronavirus OC43 Not Detected     COVID19 Detected     Human Metapneumovirus Not Detected     Human Rhinovirus/Enterovirus Not Detected     Influenza A PCR Not Detected     Influenza A H1 Not Detected     Influenza A H1 2009 PCR Not Detected     Influenza A H3 Not Detected     Influenza B PCR Not Detected     Parainfluenza Virus 1 Not Detected     Parainfluenza Virus 2 Not Detected     Parainfluenza Virus 3 Not Detected     Parainfluenza Virus 4 Not Detected     RSV, PCR Not Detected     Bordetella pertussis pcr Not Detected     Bordetella parapertussis PCR Not Detected     Chlamydophila pneumoniae PCR Not Detected     Mycoplasma pneumo by PCR Not Detected    Narrative:      Fact sheet for providers: https://docs.[employer]/wp-content/uploads/KDA9375-3437-YI5.1-EUA-Provider-Fact-Sheet-3.pdf    Fact sheet for patients: https://docs.[employer]/wp-content/uploads/WMU9800-8136-TY3.1-EUA-Patient-Fact-Sheet-1.pdf    Test performed by PCR.          Imaging Results (All)     Procedure Component Value Units Date/Time    CT Angiogram Chest [[id number]] Collected: 12/28/20 1531     Updated: 12/28/20 1626    Narrative:      EXAMINATION: CT ANGIOGRAM CHEST-      INDICATION: COVID +, elevated D-dimer, PE protocol     TECHNIQUE: Axial pulmonary arterial phase IV contrast enhanced CT  angiogram of the chest per PE protocol. Two-dimensional reconstructions  were postprocessed.     The radiation dose reduction device was turned on for each scan per the  ALARA (As Low as Reasonably Achievable) protocol.     COMPARISON: NONE     FINDINGS: No pathologic axillary adenopathy or other worrisome body wall  soft tissue finding. No acute findings in the partially imaged upper  abdomen. No  evidence of acute fracture or aggressive osseous lesion.  Normal caliber mildly atherosclerotic thoracic aorta. No pleural or  pericardial effusion. No pathologic mediastinal or hilar adenopathy.  Unremarkable thoracic esophagus with a small hiatal hernia noted. The  pulmonary arteries are well-opacified, with a small filling defect noted  involving a right lower lobe segmental branch concerning for acute  nonocclusive pulmonary embolus. There is no evidence of right heart  strain with RV to LV ratio less than 1. Evaluation of the lung  parenchyma demonstrates extensive peripheral predominant areas of  groundglass and consolidative opacity compatible with Covid 19 related  pneumonia.       Impression:      Small nonocclusive subsegmental pulmonary embolus in a right  lower lobe branch, with no evidence of associated right heart strain.     Fairly advanced typical findings of Covid 19 related pneumonia.     Findings discussed with emergency room physician by Cholo Broderick via  phone at 4:20 PM 12/28/2020     This report was finalized on 12/28/2020 4:22 PM by Cholo Borderick.       XR Chest 1 View [[id number]] Collected: 12/28/20 0946     Updated: 12/28/20 0949    Narrative:      EXAMINATION: XR CHEST 1 VW-      INDICATION: SOA triage protocol      COMPARISON: 12/11/2019     FINDINGS: There are patchy bilateral areas of airspace disease, favoring  multifocal pneumonia over edema pattern. No significant pleural effusion  or distinct pneumothorax. Unchanged heart and mediastinal contours.       Impression:      Patchy bilateral airspace disease concerning for multifocal  pneumonia, somewhat typical for Covid 19 related pneumonia.     This report was finalized on 12/28/2020 9:46 AM by Cholo Broderick.                       Results for orders placed during the hospital encounter of 12/28/20   Adult Transthoracic Echo Complete W/ Cont if Necessary Per Protocol    Narrative · Left ventricular ejection fraction appears  to be 66 - 70%.  · Sigmoid-shaped ventricular septum is present  · Normal right ventricular cavity size, wall thickness, systolic function   and septal motion noted.  · Mild tricuspid valve regurgitation is present. Estimated right   ventricular systolic pressure from tricuspid regurgitation is moderately   elevated (45-55 mmHg).  · There is no evidence of pericardial effusion          Plan for Follow-up of Pending Labs/Results:     Discharge Details        Discharge Medications      New Medications      Instructions Start Date   acetaminophen 325 MG tablet  Commonly known as: TYLENOL   650 mg, Oral, Every 4 Hours PRN      dexamethasone 6 MG tablet  Commonly known as: DECADRON   6 mg, Oral, Daily With Breakfast   Start Date: January 6, 2021     hydrOXYzine 25 MG tablet  Commonly known as: ATARAX   25 mg, Oral, 3 Times Daily PRN      lisinopril 10 MG tablet  Commonly known as: PRINIVIL,ZESTRIL   10 mg, Oral, Every 24 Hours Scheduled   Start Date: January 6, 2021     pantoprazole 40 MG EC tablet  Commonly known as: PROTONIX  Replaces: omeprazole 20 MG capsule   40 mg, Oral, Every Morning      traMADol 50 MG tablet  Commonly known as: ULTRAM   50 mg, Oral, Every 6 Hours PRN      warfarin 2.5 MG tablet  Commonly known as: COUMADIN   2.5 mg, Oral, Nightly, Check INR daily while at rehab and adjust accordingly.         Continue These Medications      Instructions Start Date   amLODIPine 10 MG tablet  Commonly known as: NORVASC   10 mg, Oral, Daily      aspirin 81 MG chewable tablet   81 mg, Oral, Daily      baclofen 10 MG tablet  Commonly known as: LIORESAL   10 mg, Oral, 3 Times Daily PRN      Pravachol 40 MG tablet  Generic drug: pravastatin   40 mg, Oral, Nightly      Vitamin E 400 units tablet   1 tablet, Oral, Daily         Stop These Medications    ibuprofen 400 MG tablet  Commonly known as: ADVIL,MOTRIN     naproxen 500 MG tablet  Commonly known as: NAPROSYN     omeprazole 20 MG capsule  Commonly known as:  "priLOSEC  Replaced by: pantoprazole 40 MG EC tablet            Allergies   Allergen Reactions   • Tizanidine Shortness Of Breath   • Hydrocodone Itching   • Iodine Swelling         Discharge Disposition:  Rehab Facility or Unit (DC - External)    Diet:  Hospital:  Diet Order   Procedures   • Diet Regular       Activity:           CODE STATUS:    Code Status and Medical Interventions:   Ordered at: 12/28/20 3486     Limited Support to NOT Include:    Intubation     Level Of Support Discussed With:    Patient     Code Status:    No CPR     Medical Interventions (Level of Support Prior to Arrest):    Limited       No future appointments.    Additional Instructions for the Follow-ups that You Need to Schedule     Ambulatory Referral to [facility]   As directed       Select To DEPT SPEC to filter TO DEPT       Send INR reminders to the \"clinical pool\" of the TO DEPT     (ie:  [facility]  or [facility] CLINICAL POOL)     Ambulatory Referral to Home Health   As directed      Face to Face Visit Date: 12/30/2020    Follow-up provider for Plan of Care?: I treated the patient in an acute care facility and will not continue treatment after discharge.    Follow-up provider: ELENI HUNTER [[id number]]    Reason/Clinical Findings: Pneumonia due to COVID-19    Describe mobility limitations that make leaving home difficult: Weakness, SOA    Nursing/Therapeutic Services Requested: Skilled Nursing    Skilled nursing orders: Medication education (lab draws for INR) Other         Discharge Follow-up with PCP   As directed       Currently Documented PCP:    Eleni Hunter MD    PCP Phone Number:    [phone]     Follow Up Details: first available hospital follow up appt.                     RENNY Gutiérrez  01/05/21      Time Spent on Discharge:  I spent  40 minutes on this discharge activity which included: face-to-face encounter with the patient, reviewing the data in the system, " coordination of the care with the nursing staff as well as consultants, documentation, and entering orders.

## 2021-01-05 NOTE — PLAN OF CARE
Goal Outcome Evaluation:  Plan of Care Reviewed With: patient  Progress: improving  Outcome Summary: sl SOB with activity as prev, RA sats ok, ankita weakness, waiting for OT and input for SNF?

## 2021-01-05 NOTE — THERAPY EVALUATION
Patient Name: Mine Curry  : 1940    MRN: [id number]                              Today's Date: 2021       Admit Date: 2020    Visit Dx:     ICD-10-CM ICD-9-CM   1. Pneumonia due to COVID-19 virus  U07.1 480.8    J12.89    2. Shortness of breath  R06.02 786.05   3. Single subsegmental pulmonary embolism without acute cor pulmonale (CMS/HCC)  I26.93 415.19     Patient Active Problem List   Diagnosis   • PVC's (premature ventricular contractions)   • Pneumonia due to COVID-19 virus   • Acute pulmonary embolism (CMS/HCC)   • Hypertension     Past Medical History:   Diagnosis Date   • Arthritis    • Atopic dermatitis 2016   • Bigeminy    • Degeneration of lumbar intervertebral disc 2018   • GERD (gastroesophageal reflux disease) 2018   • History of Holter monitoring    • HLD (hyperlipidemia) 2016   • Hypertension 2015   • Injury of back    • Measles    • Nephrolithiasis    • Osteopenia 2018   • Palpitations    • Spinal stenosis    • Spinal stenosis      Past Surgical History:   Procedure Laterality Date   • ANKLE SURGERY     • TUBAL ABDOMINAL LIGATION       General Information     Row Name 21 4950          OT Time and Intention    Document Type  evaluation  -MARTA     Mode of Treatment  individual therapy;occupational therapy  -     Row Name 21 5482          General Information    Patient Profile Reviewed  yes  -MARTA     Prior Level of Function  independent:;all household mobility;community mobility;ADL's;home management;driving;shopping;yard work  -     Existing Precautions/Restrictions  fall per nurse no alarm, pt. call out.  -MARTA     Barriers to Rehab  none identified  -MARTA     Row Name 21 0915          Living Environment    Lives With  alone  -MARTA     Row Name 21 0950          Home Main Entrance    Number of Stairs, Main Entrance  none  -MARTA     Row Name 2164          Stairs Within Home, Primary    Stair Railings, Within Home,  Primary  none  -     Row Name 01/05/21 0950          Cognition    Orientation Status (Cognition)  oriented x 3  -     Row Name 01/05/21 0950          Safety Issues, Functional Mobility    Safety Issues Affecting Function (Mobility)  awareness of need for assistance;insight into deficits/self-awareness  -     Impairments Affecting Function (Mobility)  balance;endurance/activity tolerance;strength  -       User Key  (r) = Recorded By, (t) = Taken By, (c) = Cosigned By    Initials Name Provider Type    MARTA Bethany Horowitz, OT Occupational Therapist          Mobility/ADL's     Row Name 01/05/21 0951          Bed Mobility    Supine-Sit [place] (Bed Mobility)  modified independence  -     Assistive Device (Bed Mobility)  bed rails;head of bed elevated HOB only mildly elevated  -     Row Name 01/05/21 0951          Transfers    Transfers  sit-stand transfer;toilet transfer  -     Sit-Stand [place] (Transfers)  standby assist  -     [place] Level (Toilet Transfer)  standby assist  -     Assistive Device (Toilet Transfer)  grab bars/safety frame;commode  -     Row Name 01/05/21 0951          Sit-Stand Transfer    Assistive Device (Sit-Stand Transfers)  other (see comments) gait belt  -     Row Name 01/05/21 0951          Toilet Transfer    Type (Toilet Transfer)  sit-stand;stand-sit  -     Row Name 01/05/21 0951          Functional Mobility    Functional Mobility- Ind. Level  minimum assist (75% patient effort)  -     Functional Mobility- Device  other (see comments) gait belt  -     Functional Mobility-Distance (Feet)  70  -     Functional Mobility- Safety Issues  step length decreased;balance decreased during turns  -     Functional Mobility- Comment  Pt. CGA majority of time, but then had to very unsteady periods needing min A to keep balance.  Very unbalanced with backward steps.  -     Row Name 01/05/21 0951          Activities of Daily Living    BADL  Assessment/Intervention  lower body dressing;grooming;toileting  -     Row Name 01/05/21 0951          Lower Body Dressing Assessment/Training    [place] Level (Lower Body Dressing)  don;socks;independent  -     Position (Lower Body Dressing)  edge of bed sitting  -     Row Name 01/05/21 0951          Grooming Assessment/Training    [place] Level (Grooming)  hair care, combing/brushing;wash face, hands;standby assist  -     Position (Grooming)  sink side  -     Row Name 01/05/21 0951          Toileting Assessment/Training    [place] Level (Toileting)  toileting skills;standby assist  -     Assistive Devices (Toileting)  grab bar/safety frame;commode  -     Position (Toileting)  unsupported standing;supported sitting  -       User Key  (r) = Recorded By, (t) = Taken By, (c) = Cosigned By    Initials Name Provider Type    Bethany Gabriel OT Occupational Therapist        Obj/Interventions     Row Name 01/05/21 0955          Sensory Assessment (Somatosensory)    Sensory Assessment (Somatosensory)  UE sensation intact  -[facility] Name 01/05/21 0955          Vision Assessment/Intervention    Visual Impairment/Limitations  WFL  -     Row Name 01/05/21 0955          Range of Motion Comprehensive    General Range of Motion  bilateral upper extremity ROM WNL  -[facility] Name 01/05/21 0955          Strength Comprehensive (MMT)    General Manual Muscle Testing (MMT) Assessment  upper extremity strength deficits identified  -     Comment, General Manual Muscle Testing (MMT) Assessment  distally 4/5, proximally 3+/5 BUE  -     Row Name 01/05/21 0955          Shoulder (Therapeutic Exercise)    Shoulder (Therapeutic Exercise)  AROM (active range of motion)  -     Shoulder AROM (Therapeutic Exercise)  bilateral;flexion;extension;aBduction;aDduction;horizontal aBduction/aDduction;scapular retraction;10 repetitions;5 repetitions 3 short rest periods needed  -     Row Name 01/05/21 0955           Balance    Balance Assessment  sitting dynamic balance  -MARTA     Static Sitting Balance  WFL;sitting, edge of bed  -MARTA     Dynamic Sitting Balance  WFL;sitting, edge of bed LBD  -MARTA     Static Standing Balance  WFL;unsupported  -MARTA     Dynamic Standing Balance  mild impairment;unsupported  -MARTA     Row Name 01/05/21 0955          Therapeutic Exercise    Therapeutic Exercise  shoulder  -MARTA       User Key  (r) = Recorded By, (t) = Taken By, (c) = Cosigned By    Initials Name Provider Type    Bethany Gabriel, OT Occupational Therapist        Goals/Plan     Row Name 01/05/21 1002          Transfer Goal 1 (OT)    Activity/Assistive Device (Transfer Goal 1, OT)  sit-to-stand/stand-to-sit;toilet  -MARTA     [place] Level/Cues Needed (Transfer Goal 1, OT)  independent  -MARTA     Time Frame (Transfer Goal 1, OT)  long term goal (LTG);10 days  -MARTA     Progress/Outcome (Transfer Goal 1, OT)  goal ongoing  -MARTA     Row Name 01/05/21 1002          Bathing Goal 1 (OT)    Activity/Device (Bathing Goal 1, OT)  bathing skills, all  -MARTA     [place] Level/Cues Needed (Bathing Goal 1, OT)  standby assist  -MARTA     Time Frame (Bathing Goal 1, OT)  long term goal (LTG);10 days  -MARTA     Strategies/Barriers (Bathing Goal 1, OT)  with 3 rest periods or less demonstrating good balance.  -MARTA     Progress/Outcomes (Bathing Goal 1, OT)  goal ongoing  -MARTA     Row Name 01/05/21 1002          Dressing Goal 1 (OT)    Activity/Device (Dressing Goal 1, OT)  lower body dressing eusebia undergarment.  -MARTA     [place]/Cues Needed (Dressing Goal 1, OT)  independent  -MARTA     Time Frame (Dressing Goal 1, OT)  long term goal (LTG);10 days  -MARTA     Progress/Outcome (Dressing Goal 1, OT)  goal ongoing  -MARTA     Row Name 01/05/21 1002          ROM Goal 1 (OT)    ROM Goal 1 (OT)  Pt. will demonstrate good balance and endurance to complete simple HM tasks (straightening bed, item retrieval, folding clothing) SBA with 02 sats 90% or greater room air.   -MARTA     Time Frame (ROM Goal 1, OT)  long term goal (LTG);10 days  -MARTA     Progress/Outcome (ROM Goal 1, OT)  goal ongoing  -     Row Name 01/05/21 1002          Strength Goal 1 (OT)    Strength Goal 1 (OT)  Pt. will be I with UE tband program 10 reps/2 sets each to increase strength and support ADL independence.  -MARTA     Time Frame (Strength Goal 1, OT)  long term goal (LTG);10 days  -MARTA     Progress/Outcome (Strength Goal 1, OT)  goal ongoing  -     Row Name 01/05/21 1002          Therapy Assessment/Plan (OT)    Planned Therapy Interventions (OT)  edema control/reduction;BADL retraining;functional balance retraining;occupation/activity based interventions;patient/caregiver education/training;transfer/mobility retraining;strengthening exercise;ROM/therapeutic exercise  -       User Key  (r) = Recorded By, (t) = Taken By, (c) = Cosigned By    Initials Name Provider Type    Bethany Gabriel, OT Occupational Therapist        Clinical Impression     Row Name 01/05/21 0956          Pain Assessment    Additional Documentation  Pain Scale: Numbers Pre/Post-Treatment (Group)  -     Row Name 01/05/21 0956          Pain Scale: Numbers Pre/Post-Treatment    Pretreatment Pain Rating  7/10  -MARTA     Posttreatment Pain Rating  8/10  -MARTA     Pain Location  head  -MARTA     Pre/Posttreatment Pain Comment  pain also in B lower back, per pt. with spinal stenosis, nurse alerted.  -MARTA     Pain Intervention(s)  Repositioned;Ambulation/increased activity  -     Row Name 01/05/21 0956          Plan of Care Review    Plan of Care Reviewed With  patient  -MARTA     Progress  no change  -MARTA     Outcome Summary  OT evaluation completed and treatment intiated.  Pt. MI to EOB, SBA with transfers and up to min A 70ft with mvmt. in room. Pt. was able to groom at sink, toilet SBA and I donning socks.  Pt. needs frequent rest periods throughout session. Pt completed UE TE 5-10 reps with 3 rest periods focusing on chest extension and deep  breathing.  Due to patient living alone and would be on isolation recommend IRF.  Also due  to prior I ADL and all HM tasks and now with impaired endurance, strength and balance.  Cont IP OT servcies.  -MARTA     Row Name 01/05/21 0956          Therapy Assessment/Plan (OT)    Patient/Family Therapy Goal Statement (OT)  go to rehab so can return to [place].  -MARTA     Rehab Potential (OT)  good, to achieve stated therapy goals  -MARTA     Criteria for Skilled Therapeutic Interventions Met (OT)  meets criteria;skilled treatment is necessary  -MARTA     Therapy Frequency (OT)  daily  -MARTA     Row Name 01/05/21 0956          Therapy Plan Review/Discharge Plan (OT)    Equipment Needs Upon Discharge (OT)  -- TBA post rehab  -MARTA     Anticipated Discharge Disposition (OT)  inpatient rehabilitation facility  -     Row Name 01/05/21 0956          Vital Signs    Pre Systolic BP Rehab  150  -MARTA     Pre Treatment Diastolic BP  78  -MARTA     Post Systolic BP Rehab  157  -MARTA     Post Treatment Diastolic BP  96  -MARTA     Pretreatment Heart Rate (beats/min)  45  -MARTA     Posttreatment Heart Rate (beats/min)  64  -MARTA     Pre SpO2 (%)  92  -MARTA     O2 Delivery Pre Treatment  room air  -MARTA     Post SpO2 (%)  92  -MARTA     O2 Delivery Post Treatment  supplemental O2  -MARTA     Pre Patient Position  Supine  -MARTA     Intra Patient Position  Standing  -MARTA     Post Patient Position  Sitting  -MARTA     Row Name 01/05/21 0956          Positioning and Restraints    Pre-Treatment Position  in bed  -MARAT     Post Treatment Position  chair  -MARTA     In Chair  reclined;call light within reach;encouraged to call for assist;legs elevated  -MARTA       User Key  (r) = Recorded By, (t) = Taken By, (c) = Cosigned By    Initials Name Provider Type    Bethany Gabriel, OT Occupational Therapist        Outcome Measures     Row Name 01/05/21 1005          How much help from another is currently needed...    Putting on and taking off regular lower body clothing?  3  -MARTA      Bathing (including washing, rinsing, and drying)  3  -MARTA     Toileting (which includes using toilet bed pan or urinal)  3  -MARTA     Putting on and taking off regular upper body clothing  4  -MARTA     Taking care of personal grooming (such as brushing teeth)  3  -MARTA     Eating meals  4  -MARTA     AM-PAC 6 Clicks Score (OT)  20  -MARTA     Row Name 01/05/21 1005          Functional Assessment    Outcome Measure Options  AM-PAC 6 Clicks Daily Activity (OT)  -MARTA       User Key  (r) = Recorded By, (t) = Taken By, (c) = Cosigned By    Initials Name Provider Type    Bethany Gabriel, OT Occupational Therapist        Occupational Therapy Education                 Title: PT OT SLP Therapies (In Progress)     Topic: Occupational Therapy (In Progress)     Point: ADL training (Done)     Description:   Instruct learner(s) on proper safety adaptation and remediation techniques during self care or transfers.   Instruct in proper use of assistive devices.              Learning Progress Summary           Patient Acceptance, E,D, VU,NR by MARTA at 1/5/2021 1006    Comment: reason for consutl, noted deficits, goal setting, UE TE, AB                   Point: Home exercise program (Done)     Description:   Instruct learner(s) on appropriate technique for monitoring, assisting and/or progressing therapeutic exercises/activities.              Learning Progress Summary           Patient Acceptance, E,D, VU,NR by MARTA at 1/5/2021 1006    Comment: reason for consutl, noted deficits, goal setting, UE TE, AB                   Point: Precautions (Not Started)     Description:   Instruct learner(s) on prescribed precautions during self-care and functional transfers.              Learner Progress:  Not documented in this visit.          Point: Body mechanics (Done)     Description:   Instruct learner(s) on proper positioning and spine alignment during self-care, functional mobility activities and/or exercises.              Learning Progress Summary            Patient Acceptance, E,D, VU,NR by MARTA at 1/5/2021 1006    Comment: reason for consutl, noted deficits, goal setting, UE TE, AB                               User Key     Initials Effective Dates Name Provider Type Discipline    MARTA 06/08/18 -  Bethany Horowitz OT Occupational Therapist OT              OT Recommendation and Plan  Planned Therapy Interventions (OT): edema control/reduction, BADL retraining, functional balance retraining, occupation/activity based interventions, patient/caregiver education/training, transfer/mobility retraining, strengthening exercise, ROM/therapeutic exercise  Therapy Frequency (OT): daily  Plan of Care Review  Plan of Care Reviewed With: patient  Progress: no change  Outcome Summary: OT evaluation completed and treatment intiated.  Pt. MI to EOB, SBA with transfers and up to min A 70ft with mvmt. in room. Pt. was able to groom at sink, toilet SBA and I donning socks.  Pt. needs frequent rest periods throughout session. Pt completed UE TE 5-10 reps with 3 rest periods focusing on chest extension and deep breathing.  Due to patient living alone and would be on isolation recommend IRF.  Also due  to prior I ADL and all HM tasks and now with impaired endurance, strength and balance.  Cont IP OT servcies.     Time Calculation:   Time Calculation- OT     Row Name 01/05/21 1007             Time Calculation- OT    OT Start Time  0821  -      OT Received On  01/05/21  -MARTA      OT Goal Re-Cert Due Date  01/15/21  -MARTA         Timed Charges    46542 - OT Therapeutic Exercise Minutes  6  -MARTA      64858 - OT Therapeutic Activity Minutes  6  -MARTA      94741 - OT Self Care/Mgmt Minutes  5  -MARTA        User Key  (r) = Recorded By, (t) = Taken By, (c) = Cosigned By    Initials Name Provider Type    Bethany Gabriel OT Occupational Therapist        Therapy Charges for Today     Code Description Service Date Service Provider Modifiers Qty    84977345055 HC OT THER PROC EA 15 MIN 1/5/2021 Bethany Horowitz  [place], OT GO 1    28163212894 HC OT EVAL LOW COMPLEXITY 3 1/5/2021 Bethany Horowitz OT GO 1               Bethany Horowitz, DEON  1/5/2021

## 2021-01-05 NOTE — PROGRESS NOTES
"Pharmacy Consult  -  Warfarin    Mine Curry is a  80 y.o. female   Height - 162.6 cm (64\")  Weight - 75.3 kg (166 lb)    Consulting Provider: - Radha Caro MD  Indication: - PE  Goal INR: - 2-3  Home Regimen:   - new start    Bridge Therapy: Yes   and unfractionated heparin    Drug-Drug Interactions with current regimen:   Aspirin - may increase bleeding risk              Dexamethasone - may increase or decrease warfarin effect              Pantoprazole - may increase INR    Warfarin Dosing During Admission:    Date  12/30 12/31 1/1 1/2 1/3 1/4 1/5     INR  1.18 1.3 1.6 1.9 2.94 3.85 3.37     Dose  5mg 5mg 5mg 5mg 2.5mg  0 2.5mg         Education Provided: Warfarin education provided on 12/31 verbally and in writing.  Discussed effects of warfarin, importance of checking INR, drug-drug and drug-food interactions, and signs/symptoms of bleeding and clotting.  Patient verbalized understanding through teach back.  All pertinent questions were answered.        Discharge Follow up:   Following Provider -  [facility]   Follow up time range or appointment - 2-3 days following discharge      Labs:    Results from last 7 days   Lab Units 01/05/21  1202 01/04/21  0711 01/03/21  0655 01/02/21  0338 01/01/21  1842 01/01/21  0841 01/01/21  0034 12/31/20  0647 12/31/20  0013 12/30/20  0536   INR  3.37* 3.85* 2.94* 1.90*  --  1.60*  --  1.32*  --  1.18*   APTT seconds  --   --   --   --  103.3* 109.7*  --  74.8 57.7  --    HEMOGLOBIN g/dL 13.6 13.1 13.3 13.2  --   --  12.2 12.8  --  11.6*   HEMATOCRIT % 43.1 43.1 43.1 40.8  --   --  37.4 39.9  --  36.9     Results from last 7 days   Lab Units 01/05/21  1202 01/04/21  0711 01/03/21  1341   CREATININE mg/dL 0.60 0.64 0.70   BILIRUBIN mg/dL 0.2 0.2 0.2   ALK PHOS U/L 73 69 79   ALT (SGPT) U/L 27 28 34*   AST (SGOT) U/L 14 14 21       Current dietary intake: 75% of documented meals  Diet Order   Procedures    Diet Regular       Assessment/Plan:   Pharmacy to " dose Warfarin secondary to PE.   Goal INR: 2-3  1/5 INR - 3.37. decreased from 3.85  1/5 H/H - 13.6/43.1    With decreasing INR will restart warfarin 2.5mg daily  [facility] to obtain INR and follow with [facility]  Monitor s/s bleeding, clinical status, dietary intake and drug-drug interactions  Follow daily INR and adjust dose appropriately    Thanks  Baltazar Calderon [facility]   1/5/2021  13:03 EST

## 2021-02-26 ENCOUNTER — TELEPHONE (OUTPATIENT)
Dept: ONCOLOGY | Facility: CLINIC | Age: 81
End: 2021-02-26

## 2021-02-26 NOTE — TELEPHONE ENCOUNTER
Caller: CARLOS HO    Relationship to patient: SELF    Best call back number:     Chief complaint: PT CALLED TO CANCEL NP APPT ON 3-1-2021 DUE TO TAKING CARE OF A SICK BROTHER AND SON. SHE DOES NOT KNOW WHEN SHE WILL BE ABLE TO COME IN. BUT IS FINE HAVING THE APPT RESCEHDULED.    Type of visit: NEW PATIENT APPT    If rescheduling, when is the original appointment: 3-1-2021